# Patient Record
Sex: MALE | Race: BLACK OR AFRICAN AMERICAN | Employment: UNEMPLOYED | URBAN - METROPOLITAN AREA
[De-identification: names, ages, dates, MRNs, and addresses within clinical notes are randomized per-mention and may not be internally consistent; named-entity substitution may affect disease eponyms.]

---

## 2018-05-05 ENCOUNTER — HOSPITAL ENCOUNTER (INPATIENT)
Age: 33
LOS: 6 days | Discharge: HOME | DRG: 885 | End: 2018-05-11
Attending: PSYCHIATRY & NEUROLOGY | Admitting: PSYCHIATRY & NEUROLOGY
Payer: COMMERCIAL

## 2018-05-05 DIAGNOSIS — F28 OTHER PSYCHOTIC DISORDER NOT DUE TO SUBSTANCE OR KNOWN PHYSIOLOGICAL CONDITION (HCC): ICD-10-CM

## 2018-05-05 PROBLEM — F29 PSYCHOSIS (HCC): Status: ACTIVE | Noted: 2018-05-05

## 2018-05-05 LAB
ALBUMIN SERPL-MCNC: 4.1 G/DL (ref 3.5–5)
ALBUMIN/GLOB SERPL: 1.1 {RATIO} (ref 1.1–2.2)
ALP SERPL-CCNC: 69 U/L (ref 45–117)
ALT SERPL-CCNC: 43 U/L (ref 12–78)
ANION GAP SERPL CALC-SCNC: 7 MMOL/L (ref 5–15)
AST SERPL-CCNC: 133 U/L (ref 15–37)
BILIRUB SERPL-MCNC: 1.1 MG/DL (ref 0.2–1)
BUN SERPL-MCNC: 12 MG/DL (ref 6–20)
BUN/CREAT SERPL: 10 (ref 12–20)
CALCIUM SERPL-MCNC: 9.4 MG/DL (ref 8.5–10.1)
CHLORIDE SERPL-SCNC: 111 MMOL/L (ref 97–108)
CK SERPL-CCNC: 9441 U/L (ref 39–308)
CO2 SERPL-SCNC: 24 MMOL/L (ref 21–32)
CREAT SERPL-MCNC: 1.18 MG/DL (ref 0.7–1.3)
GLOBULIN SER CALC-MCNC: 3.6 G/DL (ref 2–4)
GLUCOSE SERPL-MCNC: 80 MG/DL (ref 65–100)
POTASSIUM SERPL-SCNC: 4.6 MMOL/L (ref 3.5–5.1)
PROT SERPL-MCNC: 7.7 G/DL (ref 6.4–8.2)
SODIUM SERPL-SCNC: 142 MMOL/L (ref 136–145)
TSH SERPL DL<=0.05 MIU/L-ACNC: 0.95 UIU/ML (ref 0.36–3.74)

## 2018-05-05 PROCEDURE — 84443 ASSAY THYROID STIM HORMONE: CPT | Performed by: PSYCHIATRY & NEUROLOGY

## 2018-05-05 PROCEDURE — 74011250637 HC RX REV CODE- 250/637: Performed by: PSYCHIATRY & NEUROLOGY

## 2018-05-05 PROCEDURE — 65220000003 HC RM SEMIPRIVATE PSYCH

## 2018-05-05 PROCEDURE — 82550 ASSAY OF CK (CPK): CPT | Performed by: PSYCHIATRY & NEUROLOGY

## 2018-05-05 PROCEDURE — 95816 EEG AWAKE AND DROWSY: CPT | Performed by: PSYCHIATRY & NEUROLOGY

## 2018-05-05 PROCEDURE — 74011250636 HC RX REV CODE- 250/636

## 2018-05-05 PROCEDURE — 36415 COLL VENOUS BLD VENIPUNCTURE: CPT | Performed by: PSYCHIATRY & NEUROLOGY

## 2018-05-05 PROCEDURE — 74011250636 HC RX REV CODE- 250/636: Performed by: NURSE PRACTITIONER

## 2018-05-05 PROCEDURE — 74011250636 HC RX REV CODE- 250/636: Performed by: PSYCHIATRY & NEUROLOGY

## 2018-05-05 PROCEDURE — 80053 COMPREHEN METABOLIC PANEL: CPT | Performed by: PSYCHIATRY & NEUROLOGY

## 2018-05-05 RX ORDER — BENZTROPINE MESYLATE 2 MG/1
2 TABLET ORAL
Status: DISCONTINUED | OUTPATIENT
Start: 2018-05-05 | End: 2018-05-11 | Stop reason: HOSPADM

## 2018-05-05 RX ORDER — ADHESIVE BANDAGE
30 BANDAGE TOPICAL DAILY PRN
Status: DISCONTINUED | OUTPATIENT
Start: 2018-05-05 | End: 2018-05-11 | Stop reason: HOSPADM

## 2018-05-05 RX ORDER — DIPHENHYDRAMINE HYDROCHLORIDE 50 MG/ML
INJECTION, SOLUTION INTRAMUSCULAR; INTRAVENOUS
Status: COMPLETED
Start: 2018-05-05 | End: 2018-05-05

## 2018-05-05 RX ORDER — IBUPROFEN 200 MG
1 TABLET ORAL
Status: DISCONTINUED | OUTPATIENT
Start: 2018-05-05 | End: 2018-05-11 | Stop reason: HOSPADM

## 2018-05-05 RX ORDER — FLUPHENAZINE HYDROCHLORIDE 1 MG/1
1 TABLET ORAL 2 TIMES DAILY
Status: DISCONTINUED | OUTPATIENT
Start: 2018-05-05 | End: 2018-05-06

## 2018-05-05 RX ORDER — ACETAMINOPHEN 325 MG/1
650 TABLET ORAL
Status: DISCONTINUED | OUTPATIENT
Start: 2018-05-05 | End: 2018-05-11 | Stop reason: HOSPADM

## 2018-05-05 RX ORDER — DIPHENHYDRAMINE HYDROCHLORIDE 50 MG/ML
50 INJECTION, SOLUTION INTRAMUSCULAR; INTRAVENOUS ONCE
Status: DISCONTINUED | OUTPATIENT
Start: 2018-05-05 | End: 2018-05-05

## 2018-05-05 RX ORDER — SODIUM CHLORIDE 0.9 % (FLUSH) 0.9 %
SYRINGE (ML) INJECTION
Status: COMPLETED
Start: 2018-05-05 | End: 2018-05-05

## 2018-05-05 RX ORDER — HALOPERIDOL 5 MG/ML
5 INJECTION INTRAMUSCULAR ONCE
Status: DISCONTINUED | OUTPATIENT
Start: 2018-05-05 | End: 2018-05-05

## 2018-05-05 RX ORDER — BENZTROPINE MESYLATE 1 MG/ML
2 INJECTION INTRAMUSCULAR; INTRAVENOUS
Status: DISCONTINUED | OUTPATIENT
Start: 2018-05-05 | End: 2018-05-11 | Stop reason: HOSPADM

## 2018-05-05 RX ORDER — IBUPROFEN 400 MG/1
400 TABLET ORAL
Status: DISCONTINUED | OUTPATIENT
Start: 2018-05-05 | End: 2018-05-11 | Stop reason: HOSPADM

## 2018-05-05 RX ORDER — LORAZEPAM 2 MG/ML
2 INJECTION INTRAMUSCULAR ONCE
Status: COMPLETED | OUTPATIENT
Start: 2018-05-05 | End: 2018-05-05

## 2018-05-05 RX ORDER — SODIUM CHLORIDE 9 MG/ML
125 INJECTION, SOLUTION INTRAVENOUS CONTINUOUS
Status: DISCONTINUED | OUTPATIENT
Start: 2018-05-05 | End: 2018-05-06

## 2018-05-05 RX ORDER — ZOLPIDEM TARTRATE 10 MG/1
10 TABLET ORAL
Status: DISCONTINUED | OUTPATIENT
Start: 2018-05-05 | End: 2018-05-11 | Stop reason: HOSPADM

## 2018-05-05 RX ORDER — OLANZAPINE 5 MG/1
5 TABLET ORAL
Status: DISCONTINUED | OUTPATIENT
Start: 2018-05-05 | End: 2018-05-11 | Stop reason: HOSPADM

## 2018-05-05 RX ORDER — LORAZEPAM 1 MG/1
1 TABLET ORAL
Status: DISCONTINUED | OUTPATIENT
Start: 2018-05-05 | End: 2018-05-11 | Stop reason: HOSPADM

## 2018-05-05 RX ORDER — LORAZEPAM 2 MG/ML
2 INJECTION INTRAMUSCULAR
Status: DISCONTINUED | OUTPATIENT
Start: 2018-05-05 | End: 2018-05-11 | Stop reason: HOSPADM

## 2018-05-05 RX ORDER — HALOPERIDOL 5 MG/ML
5 INJECTION INTRAMUSCULAR
Status: DISCONTINUED | OUTPATIENT
Start: 2018-05-05 | End: 2018-05-11 | Stop reason: HOSPADM

## 2018-05-05 RX ORDER — LORAZEPAM 2 MG/ML
INJECTION INTRAMUSCULAR
Status: DISPENSED
Start: 2018-05-05 | End: 2018-05-05

## 2018-05-05 RX ORDER — DIPHENHYDRAMINE HYDROCHLORIDE 50 MG/ML
50 INJECTION, SOLUTION INTRAMUSCULAR; INTRAVENOUS
Status: DISCONTINUED | OUTPATIENT
Start: 2018-05-05 | End: 2018-05-11 | Stop reason: HOSPADM

## 2018-05-05 RX ORDER — HALOPERIDOL 5 MG/ML
INJECTION INTRAMUSCULAR
Status: COMPLETED
Start: 2018-05-05 | End: 2018-05-05

## 2018-05-05 RX ADMIN — HALOPERIDOL LACTATE 5 MG: 5 INJECTION, SOLUTION INTRAMUSCULAR at 03:35

## 2018-05-05 RX ADMIN — LORAZEPAM 1 MG: 1 TABLET ORAL at 20:06

## 2018-05-05 RX ADMIN — SODIUM CHLORIDE 1000 ML: 900 INJECTION, SOLUTION INTRAVENOUS at 11:37

## 2018-05-05 RX ADMIN — FLUPHENAZINE HYDROCHLORIDE 1 MG: 1 TABLET, FILM COATED ORAL at 17:27

## 2018-05-05 RX ADMIN — FLUPHENAZINE HYDROCHLORIDE 1 MG: 1 TABLET, FILM COATED ORAL at 21:15

## 2018-05-05 RX ADMIN — Medication 10 ML: at 11:37

## 2018-05-05 RX ADMIN — DIPHENHYDRAMINE HYDROCHLORIDE 50 MG: 50 INJECTION, SOLUTION INTRAMUSCULAR; INTRAVENOUS at 03:34

## 2018-05-05 RX ADMIN — OLANZAPINE 5 MG: 5 TABLET, FILM COATED ORAL at 20:06

## 2018-05-05 RX ADMIN — LORAZEPAM 2 MG: 2 INJECTION INTRAMUSCULAR; INTRAVENOUS at 03:34
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
## 2018-05-05 NOTE — PROGRESS NOTES
Problem: Violent Restraints  Goal: *No harm/injury to patient while restraints in use  Outcome: Progressing Towards Goal  No S/S of injury, pulses checked, circulation intact. Will continue to monitor.

## 2018-05-05 NOTE — BH NOTES
GRATITUDE GROUP THERAPY PROGRESS NOTE    The patient Giovani miller 33 y.o. male is participating in Gratitude Group.     Group time: 15 minutes    Personal goal for participation: Appreciation of all things big and small    Goal orientation: Gratitude exercises    Group therapy participation: active    Therapeutic interventions reviewed and discussed:  Yes    Impression of participation: active    Sarah Mcgrath  5/5/2018  11:19 AM

## 2018-05-05 NOTE — BSMART NOTE
Behavioral Restraint Face-to-Face Evaluation  (must be completed within one hour of initiation of restraints)      Evaluate immediate situation:  Agitated, restless, paranoid,  unpredictable, and unable to be safe. Reaction to intervention: restraints continued    Medical Condition/Assessment: unknown    Behavioral Condition/Assessment: Agitated, restless, paranoid,  unpredictable, and unable to be safe. The patients review of systems, history, medications, and recent labs were reviewed at this time.      Continue/Discontinue restraints at this time: Continue

## 2018-05-05 NOTE — BH NOTES
BEHAVIORAL HEALTH RESTRAINT/SECLUSION INITIAL ASSESSMENT      1. Assessment of High Risk factors: Preexisting medical conditions that places patient at greater risk when placed in restraints are as follows: None    2. Preexisting history of psychological conditions that place patient at greater risk when placed in restraints: None    3. Current behavior/mental status: Confused, Delusional, Disoriented and Hallucinating    4. Initial use of restraint for this patient is justified by: Imminent risk of injury to others and Imminent risk of injury to self    5. Least restrictive tools/methods (Check all that apply): Attended comfort needs, Attentive listening, Problem solving, PRN medications, Relaxation and Verbal de-escalation    6. Criteria for release: No longer verbalizing threats of harm to self or others, Acute signs and symptoms necessitating restraint/seclusion have decreased substantially to the level where patient safety function in less restrictive environment and Substantial reduction in level of agitation/anxiety as indicated in reduction in motor over activity, ability to focus, maintain attention and decrease in hostility    7. Treatment plan revisions to assist the patient in regaining control: Anger assessment, Continue problem solving discussions with staff and Medication evaluation    8. Patient consented to family involvement in restraint/seclusion process: No    9. Personal safety search completed: Yes - multiple abrasions on skin present prior to arrival    10.  Vital Signs:         B/P: 133/82         Pulse:71         Respirations:18         Temperature: 98.4        MD/RN Signature:_________________________________  Date:_____________
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
## 2018-05-05 NOTE — PROGRESS NOTES
Problem: Altered Thought Process (Adult/Pediatric)  Goal: *STG: Remains safe in hospital  Outcome: Progressing Towards Goal  Visible on the unit. Mood is paranoid and anxious. Pacing noted. Pt refused IV fluids. Encouraged pt to increase oral fluids. Pt refused CT. Education provided. Continue to encourage and educate.

## 2018-05-05 NOTE — BH NOTES
GROUP THERAPY PROGRESS NOTE    Rina Christianson is participating in D/C Planning Group    Group time: 1 hour    Personal goal for participation: Readiness for discharge    Goal orientation: Tapping Into Your [address] [place] [address] for [facility]    Group therapy participation: minimal    Therapeutic interventions reviewed and discussed:     Impression of participation: Engaged, provided some input  Into group and shared minimal information about himself

## 2018-05-05 NOTE — BH NOTES
TRANSFER - IN REPORT:    Verbal report received from Arnulfo Villar RN(name) on Yazan Espinosa  being received from [facility], ER(unit) for routine progression of care      Report consisted of patients Situation, Background, Assessment and   Recommendations(SBAR). Information from the following report(s) SBAR, Kardex, ED Summary and Recent Results was reviewed with the receiving nurse. Opportunity for questions and clarification was provided. Assessment completed upon patients arrival to unit and care assumed.
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
## 2018-05-05 NOTE — INTERDISCIPLINARY ROUNDS
Behavioral Health Interdisciplinary Rounds     Patient Name: Lucio Garcia  Age: 33 y.o. Room/Bed:  730/01  Primary Diagnosis: <principal problem not specified>   Admission Status: TDO     Readmission within 30 days: no  Power of  in place: unknown  Patient requires a blocked bed: yes          Reason for blocked bed: Behavior    VTE Prophylaxis: No    Mobility needs/Fall risk: no    Nutritional Plan: no  Consults: Triage hospitalist         Labs/Testing due today?: yes    Sleep hours: 0       Participation in Care/Groups: New admit  Medication Compliant? No scheduled meds  PRNS (last 24 hours): Antipsychotic (IM), Antianxiety and Sleep Aid    Restraints (last 24 hours):  yes     CIWA (range last 24 hours):     COWS (range last 24 hours):      Alcohol screening (AUDIT) completed -   AUDIT Score: 0     If applicable, date SBIRT discussed in treatment team AND documented:     Tobacco - patient is a smoker: Have You Used Tobacco in the Past 30 Days: No  Illegal Drugs use:      24 hour chart check complete: yes     Patient goal(s) for today: Meet with Tx team and attend groups   Treatment team focus/goals: Complete Psych Social Assessment   Progress note: Alert, engaging,, pleasant, respectful,  denied having any psychosis and unsure of the events that lead to hospitalization    LOS:  0  Expected LOS:    Financial concerns/prescription coverage:  TDO  Date of last family contact:      Family requesting physician contact today:    Discharge plan: return to live with hay and their kids. (2)   Guns in the home: denied      Outpatient provider(s): TBD    Participating treatment team members: Lucio Garcia, Dr. Ronnie Mora RN and Yina Strickland

## 2018-05-05 NOTE — IP AVS SNAPSHOT
[address] 
[phone] Patient: Radha Higginbotham MRN: [id number] :1985 A check joel indicates which time of day the medication should be taken. My Medications START taking these medications Instructions Each Dose to Equal  
 Morning Noon Evening Bedtime  
 amLODIPine 10 mg tablet Commonly known as:  Kannan Rings Start taking on:  2018 Your last dose was: Your next dose is: Take 1 Tab by mouth daily. Indications: hypertension 10 mg  
    
   
   
   
  
 fluPHENAZine 5 mg tablet Commonly known as:  PROLIXIN Your last dose was: Your next dose is: Take 1 Tab by mouth two (2) times a day. Indications: Psychotic Disorder 5 mg Where to Get Your Medications These medications were sent to [facility] Bianca Tang, [address] [place] [address], [address] [place] [address] Phone:  [phone]  
  amLODIPine 10 mg tablet  
 fluPHENAZine 5 mg tablet

## 2018-05-05 NOTE — BH NOTES
Behavioral Restraint Face-to-Face Evaluation  (must be completed within one hour of initiation of restraints)    Evaluate immediate situation:  Pt agitated, combative and actively hallucinating. States he sees demons and is afraid he will hurt someone. Reaction to intervention: Pt remains restless and agitated. Medical Condition/Assessment: None listed    Behavioral Condition/Assessment: Pt agitated, combative and actively hallucinating. States he sees demons and is afraid he will hurt someone. VS: 98.4 -71-18 - 133/82. O2 Sat  98%. The patients review of systems, history, medications, and recent labs were reviewed at this time. Continue/Discontinue restraints at this time: Continue       0425- Pt remains restless and hallucinating. Respirations even and unlabored. 4 point restraints and 1:1 monitoring   continued. 7398 - Pt continues to be restless, mumble and hallucinate. 4 point restraints continued for safety. 1:1 staff present. NAD. Respirations even and unlabored at 12.    0615 - Pt remains restless; continues to hallucinate; unable to meet release criteria. NAD. Respirations remain even & unlabored. Remains in 4 pt restraints. 1:1 monitoring continued.

## 2018-05-05 NOTE — IP AVS SNAPSHOT
Summary of Care Report The Summary of Care report has been created to help improve care coordination. Users with access to [employer] or [address] (Web-based application) may access additional patient information including the Discharge Summary. If you are not currently a [address] user and need more information, please call the number listed below in the [place] [address] section and ask to be connected with Medical Records. Facility Information Name Address Phone [address] [facility] 7 [phone] [phone] Patient Information Patient Name Sex SNEHAL Ch ([id number]) Male 1985 Discharge Information Admitting Provider Service Area Unit Yung Yoon MD / Surjit Smith [phone] / [phone] Discharge Provider Discharge Date/Time Discharge Disposition Destination (none) 2018 Afternoon (Pending) AHR (none) Patient Language Language ENGLISH [13] Hospital Problems as of 2018  Never Reviewed Class Noted - Resolved Last Modified POA Active Problems * (Principal)Psychosis  2018 - Present 2018 by Angelica Salguero MD Yes Entered by Yung Yoon MD  
  
You are allergic to the following No active allergies Current Discharge Medication List  
  
START taking these medications Dose & Instructions Dispensing Information Comments  
 amLODIPine 10 mg tablet Commonly known as:  Remonia Grates Start taking on:  2018 Dose:  10 mg Take 1 Tab by mouth daily. Indications: hypertension Quantity:  7 Tab Refills:  0  
   
 fluPHENAZine 5 mg tablet Commonly known as:  PROLIXIN Dose:  5 mg Take 1 Tab by mouth two (2) times a day. Indications: Psychotic Disorder Quantity:  14 Tab Refills:  0 Follow-up Information Follow up With Details Comments Contact Info [place] [address] [facility] On 2018 You have an 11:30am hospital discharge appointment with Sharri Maryjohnathan. Please remember to bring your photo ID. [address] [facility] [place] [facility] 
[phone] None   None (395) Patient stated that they have no PCP Discharge Instructions DISCHARGE SUMMARY 
 
NAME:[place] : 1985 MRN: [id number] The patient Glorine Drivers exhibits the ability to control behavior in a less restrictive environment. Patient's level of functioning is improving. No assaultive/destructive behavior has been observed for the past 24 hours. No suicidal/homicidal threat or behavior has been observed for the past 24 hours. There is no evidence of serious medication side effects. Patient has not been in physical or protective restraints for at least the past 24 hours. If weapons involved, how are they secured? No weapons involved Is patient aware of and in agreement with discharge plan? Yes Arrangements for medication:  Prescriptions filled per [place] Referral for substance abuse treatment? Yes, [place] [address] [facility] Referral for smoking cessation needed? Patient is not a smoker/Not applicable Copy of discharge instructions to  provider?:  Yes, [place] [address] [facility] (Patricia Terrell. [phone]; Sindy Kerr [phone]) Arrangements for transportation home:  Family to  Keep all follow up appointments as scheduled, continue to take prescribed medications per physician instructions. Mental health crisis number:  178 or your local mental health crisis line number at [phone]. DISCHARGE SUMMARY from Nurse PATIENT INSTRUCTIONS: 
  
What to do at Home: 
Recommended activity: Activity as tolerated, If you experience any of the following symptoms thoughts of harming self, feeling paranoid and unsafe, please follow up with your assigned providers and local crisis number. *  Please give a list of your current medications to your Primary Care Provider. *  Please update this list whenever your medications are discontinued, doses are 
    changed, or new medications (including over-the-counter products) are added. *  Please carry medication information at all times in case of emergency situations. These are general instructions for a healthy lifestyle: No smoking/ No tobacco products/ Avoid exposure to second hand smoke Surgeon General's Warning:  Quitting smoking now greatly reduces serious risk to your health. Obesity, smoking, and sedentary lifestyle greatly increases your risk for illness A healthy diet, regular physical exercise & weight monitoring are important for maintaining a healthy lifestyle You may be retaining fluid if you have a history of heart failure or if you experience any of the following symptoms:  Weight gain of 3 pounds or more overnight or 5 pounds in a week, increased swelling in our hands or feet or shortness of breath while lying flat in bed. Please call your doctor as soon as you notice any of these symptoms; do not wait until your next office visit. Recognize signs and symptoms of STROKE: 
 
F-face looks uneven A-arms unable to move or move unevenly S-speech slurred or non-existent T-time-call 911 as soon as signs and symptoms begin-DO NOT go Back to bed or wait to see if you get better-TIME IS BRAIN. Warning Signs of HEART ATTACK Call 911 if you have these symptoms: 
? Chest discomfort. Most heart attacks involve discomfort in the center of the chest that lasts more than a few minutes, or that goes away and comes back. It can feel like uncomfortable pressure, squeezing, fullness, or pain. ? Discomfort in other areas of the upper body. Symptoms can include pain or discomfort in one or both arms, the back, neck, jaw, or stomach. ? Shortness of breath with or without chest discomfort. ? Other signs may include breaking out in a cold sweat, nausea, or lightheadedness. Don't wait more than five minutes to call [address]! Fast action can save your life. Calling 911 is almost always the fastest way to get lifesaving treatment. Emergency Medical Services staff can begin treatment when they arrive  up to an hour sooner than if someone gets to the hospital by car. The discharge information has been reviewed with the patient. The patient verbalized understanding. Discharge medications reviewed with the patient and appropriate educational materials and side effects teaching were provided. ___________________________________________________________________________________________________________________________________ Chart Review Routing History No Routing History on File

## 2018-05-05 NOTE — PROGRESS NOTES
Pt alert oriented calm cooperative. CK 9441. Hospitalist aware. Order given to place IV, given bolus fluid then continuous fluid, reassess lab work in morning. Education provided to pt. Pt verbally agrees to IV  And IV fluids. 1145- 20 g IV placed in left forearm by Keiry Medeiros RN from ICU Unit. 2 attempts to place IV. Bolus normal saline 1000 mL/hr running. Pt tolerating poorly. 250 mL input. 1310- pt met with NP. VS stable. 1330- Pt refuses IV fluids. Education provided. Pt continues to refuses IV fluids stating \"I can only do a little at a time I guess. \" pt agrees to increase oral fluid intake. Water provided. 46- mother visiting pt. Mother focused on bible with loud speech. Pt begins pacing unit, pt becoming  Religiously focused. Increased pacing attempting to hug peers and staff. Pt redirected. Visit ends early. Pt refuses prn medications. Education provided. Staff providing therapeutic communication   [phone]- NP aware pt is refusing IV fluids. Oral fluids encouraged. 1425- pt refuses to sign CANELO for mother.
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
## 2018-05-05 NOTE — BH NOTES
[address] [facility] 60  Master Treatment Plan for Arnulfo Chew    Date Treatment Plan Initiated: 5/5/2018    Treatment Plan Modalities:  Type of Modality Amount  (x minutes) Frequency (x/week) Duration (x days) Name of Responsible Staff   [address] [facility] meetings to encourage peer interactions 15 7 1 ANDRE Eagle     Group psychotherapy to assist in building coping skills and internal controls 60 7 1 Ned Dunham   Therapeutic activity groups to build coping skills 60 7 1 Ned Dunham   Psychoeducation in group setting to address:   Medication education   15 7 1 RENEA Costa   Coping skills         Relaxation techniques         Symptom management         Discharge planning   60 2 Carolyn Lee 115   60 1 1 volunteer   Recovery/AA/NA      volunteer   Physician medication management   15 7 1 Dr Juana Plolard   15 2 [facility]

## 2018-05-05 NOTE — BH NOTES
0720: Received pt in restraints. Pt is restless. Pt stated to writer, \"I just smoked weed. \" Will continue to reassess. 0756: Pt is currently sleeping AEB snoring. Restraints DC.    7717: Pt is currently sleeping in psych 1.                    0756: BEHAVIORAL HEALTH RESTRAINT/SECLUSION PROGRESS NOTE TERMINATION OF RESTRAINT/SECLUSION    1. Current mental status/behavior: Calm    2. Criteria for release met: No longer verbalizing threats of harm to self and others, Acute signs and symptoms necessitating restraint/seclusion have decreased substantially to the level where patient can safely function in least restrictive environment. and Substantial reduction in level of agitation/anxiety as indicated by reduction in motor over activity, ability to focus, maintian attention and decrease in hostility    3. Additional interventions to prevent recurrence of dangerous behaviors include the following in addition to the debriefing process by the team.   n/a      RN Signature__________________________________ Date_______________      MD Signature__________________________________ Date_______________          7418: RESTRAINT DEBRIEFING FORM FOR BEHAVIOR MANAGEMENT MANJU Villalobos                                                                                               1. Did the patient request family involvement in the debriefing meeting: NO    2. Is patient  involved in the debriefing: YES    3. Did patient request family be notified of application of restraint: NO  If YES, who was notified? n/a Their perception of the event: n/a    4. Date restraint applied: 5/5/2018 Time restraint applied: 8046    6. Type of restraint applied: 4 Point    6. Who applied restraints (names): night shift staff    7. Why was restraint applied: combative behavior     8. What led to the application of restraint: combative behavior     9. What measures were tried prior to application of restraint: PRN meds     10.  During the time the patient was restrained, were the following addressed: Physical Well Being, YES, Psychological Comfort, YES, Right to Privacy, YES    11. Did the patient sustain any trauma from this incident: NO  If YES, explain: n/a  Did staff sustain any trauma from this incident: NO  If YES, explain: n/a    12. Was patient restrained/secluded for more than 12 hours? NO  If YES, was Clinical Medical Director notified? N/A  If YES, name of  on call notified: n/a    13. Did patient have (2) or more separate behavior episodes within a 12 hour period? YES  If YES, was Clinical Medical Director notified? N/A  If YES, name of  on call notified: n/a    14. What staff members participated in the debriefing? Omar Alexander RN    15. What issues were identified as a result of the debriefing? none    16.  Based on the incident, was the patient's Care Plan modified: NO  If YES, explain: n/a    RN Signature_______________________________________Date_______Time______  Patient's Signature___________________________________Date_______Time______

## 2018-05-05 NOTE — BH NOTES
PRN Medication Documentation    Specific patient behavior that led to need for PRN medication: Pt agitated, combative and actively hallucinating. Staff interventions attempted prior to PRN being given: redirection, therapeutic communication  PRN medication given: Benadryl 50 mg IM, Haldol 5 mg IM and Ativan 2 mg IM. Patient response/effectiveness of PRN medication: Pending      0425 - Pt remains restless and continues to hallucinate.

## 2018-05-05 NOTE — IP AVS SNAPSHOT
[address] 
[phone] Patient: Kalyan Tovar MRN: [id number] :1985 About your hospitalization You were admitted on: May 5, 2018 You last received care in the:  [address] You were discharged on:  May 11, 2018 Why you were hospitalized Your primary diagnosis was:  Psychosis Follow-up Information Follow up With Details Comments Contact [address] On 2018 You have an 11:30am hospital discharge appointment with Darleen Mahmood. Please remember to bring your photo ID. [address] [facility]., [place] [address] [facility] 7 
[phone] None   None ([phone]) Patient stated that they have no PCP Discharge Orders None A check joel indicates which time of day the medication should be taken. My Medications START taking these medications Instructions Each Dose to Equal  
 Morning Noon Evening Bedtime  
 amLODIPine 10 mg tablet Commonly known as:  Sundra Alburnett Start taking on:  2018 Your last dose was: Your next dose is: Take 1 Tab by mouth daily. Indications: hypertension 10 mg  
    
   
   
   
  
 fluPHENAZine 5 mg tablet Commonly known as:  PROLIXIN Your last dose was: Your next dose is: Take 1 Tab by mouth two (2) times a day. Indications: Psychotic Disorder 5 mg Where to Get Your Medications These medications were sent to [facility] Bianca Tang, [address] [place] [address], [address] [place] [address] Phone:  [phone]  
  amLODIPine 10 mg tablet  
 fluPHENAZine 5 mg tablet Discharge Instructions DISCHARGE SUMMARY 
 
NAME:[place] : 1985 MRN: [id number] The patient Kalyan Tovar exhibits the ability to control behavior in a less restrictive environment. Patient's level of functioning is improving. No assaultive/destructive behavior has been observed for the past 24 hours. No suicidal/homicidal threat or behavior has been observed for the past 24 hours. There is no evidence of serious medication side effects. Patient has not been in physical or protective restraints for at least the past 24 hours. If weapons involved, how are they secured? No weapons involved Is patient aware of and in agreement with discharge plan? Yes Arrangements for medication:  Prescriptions filled per [facility] Referral for substance abuse treatment? Yes, [place] [address] [facility] Referral for smoking cessation needed? Patient is not a smoker/Not applicable Copy of discharge instructions to  provider?:  Yes, [place] [address] [facility] ([facility], [facility] [phone]; [facility] [place] [phone]) Arrangements for transportation home:  Family to  Keep all follow up appointments as scheduled, continue to take prescribed medications per physician instructions. Mental health crisis number:  860 or your local mental health crisis line number at [phone]. DISCHARGE SUMMARY from Nurse PATIENT INSTRUCTIONS: 
  
What to do at Home: 
Recommended activity: Activity as tolerated, If you experience any of the following symptoms thoughts of harming self, feeling paranoid and unsafe, please follow up with your assigned providers and local crisis number. *  Please give a list of your current medications to your Primary Care Provider. *  Please update this list whenever your medications are discontinued, doses are 
    changed, or new medications (including over-the-counter products) are added. *  Please carry medication information at all times in case of emergency situations. These are general instructions for a healthy lifestyle: No smoking/ No tobacco products/ Avoid exposure to second hand smoke Surgeon General's Warning:  Quitting smoking now greatly reduces serious risk to your health. Obesity, smoking, and sedentary lifestyle greatly increases your risk for illness A healthy diet, regular physical exercise & weight monitoring are important for maintaining a healthy lifestyle You may be retaining fluid if you have a history of heart failure or if you experience any of the following symptoms:  Weight gain of 3 pounds or more overnight or 5 pounds in a week, increased swelling in our hands or feet or shortness of breath while lying flat in bed. Please call your doctor as soon as you notice any of these symptoms; do not wait until your next office visit. Recognize signs and symptoms of STROKE: 
 
F-face looks uneven A-arms unable to move or move unevenly S-speech slurred or non-existent T-time-call 911 as soon as signs and symptoms begin-DO NOT go Back to bed or wait to see if you get better-TIME IS BRAIN. Warning Signs of HEART ATTACK Call 911 if you have these symptoms: 
? Chest discomfort. Most heart attacks involve discomfort in the center of the chest that lasts more than a few minutes, or that goes away and comes back. It can feel like uncomfortable pressure, squeezing, fullness, or pain. ? Discomfort in other areas of the upper body. Symptoms can include pain or discomfort in one or both arms, the back, neck, jaw, or stomach. ? Shortness of breath with or without chest discomfort. ? Other signs may include breaking out in a cold sweat, nausea, or lightheadedness. Don't wait more than five minutes to call [address]! Fast action can save your life. Calling 911 is almost always the fastest way to get lifesaving treatment. Emergency Medical Services staff can begin treatment when they arrive  up to an hour sooner than if someone gets to the hospital by car. The discharge information has been reviewed with the patient.   The patient verbalized understanding. Discharge medications reviewed with the patient and appropriate educational materials and side effects teaching were provided. ___________________________________________________________________________________________________________________________________ Introducing [facility]! Qi Guardado introduces [employer] patient portal. Now you can access parts of your medical record, email your doctor's office, and request medication refills online. 1. In your internet browser, go to [email or web address] 2. Click on the First Time User? Click Here link in the Sign In box. You will see the New Member Sign Up page. 3. Enter your [employer] Access Code exactly as it appears below. You will not need to use this code after youve completed the sign-up process. If you do not sign up before the expiration date, you must request a new code. · [employer] Access Code: [email or web address] Expires: 8/9/2018  9:04 AM 
 
4. Enter the last four digits of your Social Security Number (xxxx) and Date of Birth (mm/dd/yyyy) as indicated and click Submit. You will be taken to the next sign-up page. 5. Create a [employer] ID. This will be your [employer] login ID and cannot be changed, so think of one that is secure and easy to remember. 6. Create a [employer] password. You can change your password at any time. 7. Enter your Password Reset Question and Answer. This can be used at a later time if you forget your password. 8. Enter your e-mail address. You will receive e-mail notification when new information is available in [facility] 9. Click Sign Up. You can now view and download portions of your medical record. 10. Click the Download Summary menu link to download a portable copy of your medical information. If you have questions, please visit the Frequently Asked Questions section of the [employer] website.  Remember, [employer] is NOT to be used for urgent needs. For medical emergencies, dial 911. Now available from your iPhone and Android! Introducing [employer]w As a [employer] patient, I wanted to make you aware of our electronic visit tool called [employer]w. [employer] 24/7 allows you to connect within minutes with a medical provider 24 hours a day, seven days a week via a mobile device or tablet or logging into a secure website from your computer. You can access Phong Askew from anywhere in the [place]. A virtual visit might be right for you when you have a simple condition and feel like you just dont want to get out of bed, or cant get away from work for an appointment, when your regular [employer] provider is not available (evenings, weekends or holidays), or when youre out of town and need minor care. Electronic visits cost only $49 and if the [employer] 24/7 provider determines a prescription is needed to treat your condition, one can be electronically transmitted to a nearby pharmacy*. Please take a moment to enroll today if you have not already done so. The enrollment process is free and takes just a few minutes. To enroll, please download the [employer] 24/7 alin to your tablet or phone, or visit [email or web address] to enroll on your computer. And, as an [address] [place] [address] patient with a [employer] account, the results of your visits will be scanned into your electronic medical record and your primary care provider will be able to view the scanned results. We urge you to continue to see your regular [employer] provider for your ongoing medical care. And while your primary care provider may not be the one available when you seek a Ph[employer]w virtual visit, the peace of mind you get from getting a real diagnosis real time can be priceless. For more information on [employer]kew, view our Frequently Asked Questions (FAQs) at [email or web address].  
 
Sincerely, 
 
 Cyndi Ontiveros MD 
Chief Medical Officer Radha Veloz *:  certain medications cannot be prescribed via [employer] Providers Seen During Your Hospitalization Provider Specialty Primary office phone Vickie Hdz MD Psychiatry [phone] Your Primary Care Physician (PCP) Primary Care Physician Office Phone Office Fax NONE ** None ** ** None ** You are allergic to the following No active allergies Recent Documentation Height Weight BMI  
  
  
 1.854 m 86.2 kg 25.07 kg/m2 Emergency Contacts Name Discharge Info Relation Home Work Mobile None,None N/A  AT THIS TIME [6] Patient Belongings The following personal items are in your possession at time of discharge: 
  Dental Appliances: None  Visual Aid: None      Home Medications: None   Jewelry: None  Clothing: At bedside, Shirt, Shorts, Undergarments, With patient    Other Valuables: None  Personal Items Sent to Safe: none Please provide this summary of care documentation to your next provider. Signatures-by signing, you are acknowledging that this After Visit Summary has been reviewed with you and you have received a copy. Patient Signature:  ____________________________________________________________ Date:  ____________________________________________________________  
  
Seb De Luna Provider Signature:  ____________________________________________________________ Date:  ____________________________________________________________

## 2018-05-06 LAB
AMMONIA PLAS-SCNC: 56 UMOL/L
ANION GAP SERPL CALC-SCNC: 7 MMOL/L (ref 5–15)
BUN SERPL-MCNC: 14 MG/DL (ref 6–20)
BUN/CREAT SERPL: 11 (ref 12–20)
CALCIUM SERPL-MCNC: 9.1 MG/DL (ref 8.5–10.1)
CHLORIDE SERPL-SCNC: 108 MMOL/L (ref 97–108)
CHOLEST SERPL-MCNC: 147 MG/DL
CK SERPL-CCNC: 8839 U/L (ref 39–308)
CO2 SERPL-SCNC: 27 MMOL/L (ref 21–32)
CREAT SERPL-MCNC: 1.25 MG/DL (ref 0.7–1.3)
EST. AVERAGE GLUCOSE BLD GHB EST-MCNC: 100 MG/DL
GLUCOSE P FAST SERPL-MCNC: 89 MG/DL (ref 65–100)
GLUCOSE SERPL-MCNC: 92 MG/DL (ref 65–100)
HBA1C MFR BLD: 5.1 % (ref 4.2–6.3)
HDLC SERPL-MCNC: 69 MG/DL
HDLC SERPL: 2.1 {RATIO} (ref 0–5)
LDLC SERPL CALC-MCNC: 64.8 MG/DL (ref 0–100)
LIPID PROFILE,FLP: NORMAL
POTASSIUM SERPL-SCNC: 3.7 MMOL/L (ref 3.5–5.1)
SODIUM SERPL-SCNC: 142 MMOL/L (ref 136–145)
TRIGL SERPL-MCNC: 66 MG/DL (ref ?–150)
VLDLC SERPL CALC-MCNC: 13.2 MG/DL

## 2018-05-06 PROCEDURE — 36415 COLL VENOUS BLD VENIPUNCTURE: CPT | Performed by: NURSE PRACTITIONER

## 2018-05-06 PROCEDURE — 74011250637 HC RX REV CODE- 250/637: Performed by: PSYCHIATRY & NEUROLOGY

## 2018-05-06 PROCEDURE — 82550 ASSAY OF CK (CPK): CPT | Performed by: NURSE PRACTITIONER

## 2018-05-06 PROCEDURE — 74011250636 HC RX REV CODE- 250/636: Performed by: NURSE PRACTITIONER

## 2018-05-06 PROCEDURE — 82140 ASSAY OF AMMONIA: CPT | Performed by: PSYCHIATRY & NEUROLOGY

## 2018-05-06 PROCEDURE — 65220000003 HC RM SEMIPRIVATE PSYCH

## 2018-05-06 PROCEDURE — 83036 HEMOGLOBIN GLYCOSYLATED A1C: CPT | Performed by: PSYCHIATRY & NEUROLOGY

## 2018-05-06 PROCEDURE — 80048 BASIC METABOLIC PNL TOTAL CA: CPT | Performed by: NURSE PRACTITIONER

## 2018-05-06 PROCEDURE — 82947 ASSAY GLUCOSE BLOOD QUANT: CPT | Performed by: PSYCHIATRY & NEUROLOGY

## 2018-05-06 PROCEDURE — 80061 LIPID PANEL: CPT | Performed by: PSYCHIATRY & NEUROLOGY

## 2018-05-06 RX ORDER — SODIUM CHLORIDE 0.9 % (FLUSH) 0.9 %
5-10 SYRINGE (ML) INJECTION EVERY 8 HOURS
Status: DISCONTINUED | OUTPATIENT
Start: 2018-05-06 | End: 2018-05-11 | Stop reason: HOSPADM

## 2018-05-06 RX ORDER — HALOPERIDOL 5 MG/ML
INJECTION INTRAMUSCULAR
Status: DISPENSED
Start: 2018-05-06 | End: 2018-05-07

## 2018-05-06 RX ORDER — FLUPHENAZINE HYDROCHLORIDE 5 MG/1
5 TABLET ORAL 2 TIMES DAILY
Status: DISCONTINUED | OUTPATIENT
Start: 2018-05-06 | End: 2018-05-11 | Stop reason: HOSPADM

## 2018-05-06 RX ADMIN — FLUPHENAZINE HYDROCHLORIDE 1 MG: 1 TABLET, FILM COATED ORAL at 09:08

## 2018-05-06 RX ADMIN — OLANZAPINE 5 MG: 5 TABLET, FILM COATED ORAL at 13:21

## 2018-05-06 RX ADMIN — LORAZEPAM 1 MG: 1 TABLET ORAL at 13:21

## 2018-05-06 RX ADMIN — SODIUM CHLORIDE 500 ML: 900 INJECTION, SOLUTION INTRAVENOUS at 14:31

## 2018-05-06 RX ADMIN — Medication 10 ML: at 21:11

## 2018-05-06 RX ADMIN — Medication 10 ML: at 06:04

## 2018-05-06 RX ADMIN — SODIUM CHLORIDE 500 ML: 900 INJECTION, SOLUTION INTRAVENOUS at 19:30

## 2018-05-06 RX ADMIN — FLUPHENAZINE HYDROCHLORIDE 5 MG: 5 TABLET, FILM COATED ORAL at 21:11
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
## 2018-05-06 NOTE — PROGRESS NOTES
Problem: Altered Thought Process (Adult/Pediatric)  Goal: *STG: Remains safe in hospital  Outcome: Progressing Towards Goal  Visible on the unit. Mood is anxious. Pt denies SI. Pt denies AVH. Compliant with meals and medications. Continue to encourage and educate.

## 2018-05-06 NOTE — PROGRESS NOTES
Problem: Falls - Risk of  Goal: *Absence of Falls  Document Kenisha Fall Risk and appropriate interventions in the flowsheet. Outcome: Progressing Towards Goal  Fall Risk Interventions:   Pt in bed asleep. NAD. No falls noted/reported. IV Lock noted in L antecubital space. No redness/swelling noted. Per report, pt refused continuous IV  Fluids. Will continue to encouraged  increase po fluids when awake. .Q 15 minute checks for safety maintained.     Mentation Interventions: Adequate sleep, hydration, pain control, Reorient patient, More frequent rounding

## 2018-05-06 NOTE — INTERDISCIPLINARY ROUNDS
Behavioral Health Interdisciplinary Rounds     Patient Name: Mary Day  Age: 33 y.o. Room/Bed:  730/  Primary Diagnosis: Psychosis   Admission Status: TDO     Readmission within 30 days: no  Power of  in place:unknown  Patient requires a blocked bed: no          Reason for blocked bed: n/a    VTE Prophylaxis: No    Mobility needs/Fall risk: no    Nutritional Plan: no  Consults: No         Labs/Testing due today?: yes    Sleep hours: 7.5       Participation in Care/Groups:  yes  Medication Compliant?: Yes  PRNS (last 24 hours):  Antipsychotic (PO) and Antianxiety    Restraints (last 24 hours):  yes   CIWA (range last 24 hours):     COWS (range last 24 hours):      Alcohol screening (AUDIT) completed -         If applicable, date SBIRT discussed in treatment team AND documented:     Tobacco - patient is a smoker:    Illegal Drugs use:      24 hour chart check complete: no - Admitted less than 24 hours ago    Patient goal(s) for today:   Treatment team focus/goals:   Progress note     LOS:  1  Expected LOS:     Financial concerns/prescription coverage:    Date of last family contact:       Family requesting physician contact today:    Discharge plan:   Guns in the home:         Outpatient provider(s):     Participating treatment team members: Mary Day, * (assigned SW),

## 2018-05-06 NOTE — PROGRESS NOTES
Eduction provided. Pt refuses IV fluids states he will increase oral fluid intake. 1210- 1000 mL water po intake     Writer spoke to DIYA VERDUGO. Discussed lab results. CK 8839. Order for IV bolus to run over 30 q 6 hrs. Reassess labs in AM. Education provided to pt. IV infiltrated/ removed. Pt verbalizes understanding. Pt paranoid. Attempting place new peripheral IV. One attempt to place IV not successful. 1320- pt refuses IV placement. PRN Medication Documentation    Specific patient behavior that led to need for PRN medication: paranoid delusions, pt request medication   Staff interventions attempted prior to PRN being given: education, therapeutic communication, decreases stimuli    PRN medication given: po 1 mg ativan, po 5 mg Zyprexa   Patient response/effectiveness of PRN medication: pt practicing coping skills. 1430- IV placed (4 attempts). Pt receiving IV NS bolus over 30 min. Visiting with family.
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
## 2018-05-06 NOTE — BH NOTES
GROUP THERAPY PROGRESS NOTE    Dio Chavez is participating in [employer]. Group time: 30 minutes    Personal goal for participation: relaxation    Goal orientation: personal    Group therapy participation: active    Therapeutic interventions reviewed and discussed: Pictures to color and word search. Impression of participation: On unit much of shift and socializing with peers. Periods of increased anxiety and tearfulness including when father visited. \"Father, why hast thou forsaken me! \" Observed kneeling in hallway with forehead touching floor during father's visit. Visibly upset when dad left and calling out for him. Eventually accepted staff's reassurance that he could talk/see father the next day.

## 2018-05-06 NOTE — BH NOTES
PRN Medication Documentation    Specific patient behavior that led to need for PRN medication: Patient was visiting with father; kneeling on the floor. Followed his father to the exit door yelling, \"dad dad. \"  Staff interventions attempted prior to PRN being given: Used redirections and offered PRN meds  PRN medication given: Ativan 1 mg PO and Zyprexa 5 mg PO  Patient response/effectiveness of PRN medication: Will continue to monitor.

## 2018-05-06 NOTE — PROGRESS NOTES
Problem: Altered Thought Process (Adult/Pediatric)  Goal: *STG: Remains safe in hospital  Outcome: Progressing Towards Goal  Visible on the unit. Mood is anxious. Compliant with meals and medications. Pt refused CT. Continue to encourage and educate.

## 2018-05-07 LAB
ANION GAP SERPL CALC-SCNC: 7 MMOL/L (ref 5–15)
BUN SERPL-MCNC: 12 MG/DL (ref 6–20)
BUN/CREAT SERPL: 10 (ref 12–20)
CALCIUM SERPL-MCNC: 8.9 MG/DL (ref 8.5–10.1)
CHLORIDE SERPL-SCNC: 108 MMOL/L (ref 97–108)
CK SERPL-CCNC: 4753 U/L (ref 39–308)
CO2 SERPL-SCNC: 28 MMOL/L (ref 21–32)
CREAT SERPL-MCNC: 1.17 MG/DL (ref 0.7–1.3)
GLUCOSE SERPL-MCNC: 91 MG/DL (ref 65–100)
POTASSIUM SERPL-SCNC: 3.8 MMOL/L (ref 3.5–5.1)
SODIUM SERPL-SCNC: 143 MMOL/L (ref 136–145)

## 2018-05-07 PROCEDURE — 65220000003 HC RM SEMIPRIVATE PSYCH

## 2018-05-07 PROCEDURE — 82550 ASSAY OF CK (CPK): CPT | Performed by: NURSE PRACTITIONER

## 2018-05-07 PROCEDURE — 36415 COLL VENOUS BLD VENIPUNCTURE: CPT | Performed by: NURSE PRACTITIONER

## 2018-05-07 PROCEDURE — 74011250637 HC RX REV CODE- 250/637: Performed by: PSYCHIATRY & NEUROLOGY

## 2018-05-07 PROCEDURE — 80048 BASIC METABOLIC PNL TOTAL CA: CPT | Performed by: NURSE PRACTITIONER

## 2018-05-07 PROCEDURE — 74011250636 HC RX REV CODE- 250/636: Performed by: NURSE PRACTITIONER

## 2018-05-07 RX ADMIN — FLUPHENAZINE HYDROCHLORIDE 5 MG: 5 TABLET, FILM COATED ORAL at 21:11

## 2018-05-07 RX ADMIN — FLUPHENAZINE HYDROCHLORIDE 5 MG: 5 TABLET, FILM COATED ORAL at 09:01

## 2018-05-07 RX ADMIN — Medication 10 ML: at 13:31

## 2018-05-07 RX ADMIN — SODIUM CHLORIDE 500 ML: 900 INJECTION, SOLUTION INTRAVENOUS at 19:22

## 2018-05-07 RX ADMIN — SODIUM CHLORIDE 500 ML: 900 INJECTION, SOLUTION INTRAVENOUS at 13:22

## 2018-05-07 RX ADMIN — Medication 10 ML: at 05:49

## 2018-05-07 RX ADMIN — SODIUM CHLORIDE 500 ML: 900 INJECTION, SOLUTION INTRAVENOUS at 01:06

## 2018-05-07 RX ADMIN — Medication 10 ML: at 21:13

## 2018-05-07 RX ADMIN — SODIUM CHLORIDE 500 ML: 900 INJECTION, SOLUTION INTRAVENOUS at 06:33
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
## 2018-05-07 NOTE — PROGRESS NOTES
Problem: Falls - Risk of  Goal: *Absence of Falls  Document Kenisha Fall Risk and appropriate interventions in the flowsheet. Outcome: Progressing Towards Goal  Fall Risk Interventions:       Mentation Interventions: Adequate sleep, hydration, pain control, Reorient patient, More frequent rounding    Medication Interventions: Teach patient to arise slowly                  Problem: Altered Thought Process (Adult/Pediatric)  Goal: *STG: Remains safe in hospital  Outcome: Progressing Towards Goal  q15min rounds in place, pt in bed awake resting, gripper socks on.  Will continue to monitor closely  Goal: *STG: Decreased hallucinations  Outcome: Progressing Towards Goal  Pt denies A/VH

## 2018-05-07 NOTE — BH NOTES
EEG plans to come around 1100 if pt will cooperate with test.    0839- Ibrahim TDO Hearing team to come today.
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
## 2018-05-07 NOTE — PROGRESS NOTES
Problem: Falls - Risk of  Goal: *Absence of Falls  Document Kenisha Fall Risk and appropriate interventions in the flowsheet. Outcome: Progressing Towards Goal  Fall Risk Interventions:  Patient has been fall free since arriving on the Acute Unit.   Patient is currently sleeping no stress noted       Mentation Interventions: Adequate sleep, hydration, pain control, Reorient patient, More frequent rounding

## 2018-05-07 NOTE — BH NOTES
GROUP THERAPY PROGRESS NOTE    Luisana Mahmood is participating in [place]. Group time: 15 minutes    Personal goal for participation: Walking the right path with God. Goal orientation: personal    Group therapy participation: active    Therapeutic interventions reviewed and discussed: Writer listened attentively and explained unit routine. Impression of participation: Patient participated actively.

## 2018-05-07 NOTE — PROGRESS NOTES
Problem: Falls - Risk of  Goal: *Absence of Falls  Document Kenisha Fall Risk and appropriate interventions in the flowsheet. Outcome: Progressing Towards Goal  Fall Risk Interventions:       Mentation Interventions: Adequate sleep, hydration, pain control, Reorient patient, More frequent rounding    Medication Interventions: Teach patient to arise slowly                  Problem: Altered Thought Process (Adult/Pediatric)  Goal: *STG: Remains safe in hospital  Outcome: Progressing Towards Goal  Pt denies any suicidal or homicidal thoughts. Contracts for safety. Remains on q 15 min safety checks. Goal: *STG: Decreased delusional thinking  Variance: Patient slowly responding  Comments: Pt continues to have some paranoia. Voiced he trusts the treatment team, his dad, however does not trust his mom or girlfriend. Pt was walking around with his bible. Stating he gets inspiration reading it. Pt requests to meet with rosy. Message left for rosy to see. Goal: *STG: Decreased hallucinations  Pt denies any AH/VH. Pt has outstanding CT scan, Dr. Yaw Cespedes voiced the CT can wait until pt is less of a flight risk. Plan for EEG today if pt will cooperate. Pt was cooperative during treatment team this am. Pt continues to get IVF as scheduled and CK continues to trend down.

## 2018-05-07 NOTE — INTERDISCIPLINARY ROUNDS
Behavioral Health Interdisciplinary Rounds     Patient Name: Nancy Spain  Age: 33 y.o. Room/Bed:  730/01  Primary Diagnosis: Psychosis   Admission Status: TDO     Readmission within 30 days: no  Power of  in place: no  Patient requires a blocked bed: no          Reason for blocked bed:     VTE Prophylaxis: No    Mobility needs/Fall risk: no    Nutritional Plan: no  Consults:          Labs/Testing due today?: no    Sleep hours: 7.5       Participation in Care/Groups:  no  Medication Compliant?: Yes  PRNS (last 24 hours): Antipsychotic (IM) and Antianxiety    Restraints (last 24 hours):  no     CIWA (range last 24 hours):     COWS (range last 24 hours):      Alcohol screening (AUDIT) completed -   AUDIT Score: 0     If applicable, date SBIRT discussed in treatment team AND documented:     Tobacco - patient is a smoker: Have You Used Tobacco in the Past 30 Days: No  Illegal Drugs use: Have You Used Any Illegal Substances Over the Past 12 Months: Yes    24 hour chart check complete: Yes     Patient goal(s) for today:   Treatment team focus/goals: Plan to have his hearing today. Plan to titrate his medications. Progress note He remains paranoid and guarded. Stated he slept better last night. He did get IV fluids yesterday.      LOS:  2  Expected LOS: TBD     Financial concerns/prescription coverage:  No benefits   Date of last family contact:       Family requesting physician contact today:  no  Discharge plan: he will return home when ready for discharge   Guns in the home: no         Outpatient provider(s): [place] [phone]     Participating treatment team members: Luke Nathan SW - Dr. Ginny Harper - Eual Richard, RN

## 2018-05-07 NOTE — PROGRESS NOTES
Problem: Falls - Risk of  Goal: *Absence of Falls  Document Kenisha Fall Risk and appropriate interventions in the flowsheet. Outcome: Progressing Towards Goal  Fall Risk Interventions:  Patient has been fall free since arriving on the Acute Unit. Ramiro Garibay He is currently sleeping, no stress noted.        Mentation Interventions: Adequate sleep, hydration, pain control, Reorient patient, More frequent rounding    Medication Interventions: Teach patient to arise slowly

## 2018-05-07 NOTE — BH NOTES
GROUP THERAPY PROGRESS NOTE    Luke Sol is participating in [place]. Group time: 30 minutes    Personal goal for participation: unit orientation and daily progress    Goal orientation: personal    Group therapy participation: active    Therapeutic interventions reviewed and discussed: Videos on Youtube    Impression of participation: Seems in fair spirits. Pleasant upon approach. Socializing well with peers.

## 2018-05-07 NOTE — PROGRESS NOTES
Spiritual Care Assessment/Progress Note  [facility]      NAME: Rhea Iqabl      MRN: [id number]  AGE: 33 y.o.  SEX: male  Lutheran Affiliation: No preference   Language: English     5/7/2018     Total Time (in minutes): 24     Spiritual Assessment begun in [facility] through conversation with:         [x]Patient        [] Family    [] Friend(s)        Reason for Consult: Request by patient     Spiritual beliefs: (Please include comment if needed)     [] Identifies with a benito tradition:     [] Supported by a benito community:      [] Claims no spiritual orientation:      [] Seeking spiritual identity:           [x] Adheres to an individual form of spirituality:      [] Not able to assess:                     Identified resources for coping:      [] Prayer                               [] Music                  [] Guided Imagery     [x] Family/friends                 [] Pet visits     [] Devotional reading                         [] Unknown     [x] Other: THC                                           Interventions offered during this visit: (See comments for more details)    Patient Interventions: Affirmation of emotions/emotional suffering, Catharsis/review of pertinent events in supportive environment, Coping skills reviewed/reinforced, Iconic (affirming the presence of God/Higher Power), Normalization of emotional/spiritual concerns, Prayer (actual), Lutheran beliefs/image of God discussed           Plan of Care:     [] Support spiritual and/or cultural needs    [] Support AMD and/or advance care planning process      [] Support grieving process   [] Coordinate Rites and/or Rituals    [] Coordination with community clergy   [] No spiritual needs identified at this time   [] Detailed Plan of Care below (See Comments)  [] Make referral to Music Therapy  [] Make referral to Pet Therapy     [] Make referral to Addiction services  [] Make referral to [facility]  [] Make referral to Spiritual Care Partner  [] No future visits requested        [x] Follow up visits as needed     Comments:  Responded to request from patient for prayer. Arrived in Acute BH and engaged Mr. Uribe Abo Kenia Robert) in dialogue where he described how he came to be hospitalized. Found him to be welcoming of pastoral support and transparent in conversation. Has been regular heavy user of THC and alcohol as coping substances. Is unable to identify a turning point that led to substance abuse. Is motivated to discern and develop a more disciplined lifestyle for his own sense of self worth and well being. Also desires to be a better influence on his children. Though he went to [employer] as a child and his mother still attends---is without a Christianity family or spiritual practice. Provided words of affirmation and encouragement for his stated intent to seek sobriety. Encouraged him to surround himself with sober people and spend time with his children. Also suggested becoming active in a benito community to support his spiritual growth. Shared spoken prayer and assured of  availability as needed. [facility]'s Staff  (Tiffany Roy Patient Care Specialist)   Paging Service [phone])

## 2018-05-07 NOTE — INTERDISCIPLINARY ROUNDS
Behavioral Health Interdisciplinary Rounds     Patient Name: Liat Montez  Age: 33 y.o. Room/Bed:  730/  Primary Diagnosis: Psychosis   Admission Status: TDO     Readmission within 30 days: no  Power of  in place: no  Patient requires a blocked bed: no          Reason for blocked bed:     VTE Prophylaxis: No    Mobility needs/Fall risk: no    Nutritional Plan: no  Consults:          Labs/Testing due today?: no    Sleep hours:        Participation in Care/Groups:  no  Medication Compliant?: Yes  PRNS (last 24 hours):  Antipsychotic (IM) and Antianxiety    Restraints (last 24 hours):  no     CIWA (range last 24 hours):     COWS (range last 24 hours):      Alcohol screening (AUDIT) completed -   AUDIT Score: 0     If applicable, date SBIRT discussed in treatment team AND documented:     Tobacco - patient is a smoker: Have You Used Tobacco in the Past 30 Days: No  Illegal Drugs use: Have You Used Any Illegal Substances Over the Past 12 Months: Yes    24 hour chart check complete: yes     Patient goal(s) for today:   Treatment team focus/goals:   Progress note     LOS:  2  Expected LOS:     Financial concerns/prescription coverage:  no  Date of last family contact:       Family requesting physician contact today:  no  Discharge plan:   Guns in the home:         Outpatient provider(s):     Participating treatment team members: Liat Montez, * (assigned SW),

## 2018-05-08 ENCOUNTER — HOSPITAL ENCOUNTER (OUTPATIENT)
Dept: CT IMAGING | Age: 33
Discharge: HOME | End: 2018-05-08
Attending: PSYCHIATRY & NEUROLOGY
Payer: SELF-PAY

## 2018-05-08 LAB
ANION GAP SERPL CALC-SCNC: 7 MMOL/L (ref 5–15)
BUN SERPL-MCNC: 9 MG/DL (ref 6–20)
BUN/CREAT SERPL: 8 (ref 12–20)
CALCIUM SERPL-MCNC: 8.9 MG/DL (ref 8.5–10.1)
CHLORIDE SERPL-SCNC: 109 MMOL/L (ref 97–108)
CK SERPL-CCNC: 2777 U/L (ref 39–308)
CO2 SERPL-SCNC: 26 MMOL/L (ref 21–32)
CREAT SERPL-MCNC: 1.09 MG/DL (ref 0.7–1.3)
GLUCOSE SERPL-MCNC: 91 MG/DL (ref 65–100)
POTASSIUM SERPL-SCNC: 3.5 MMOL/L (ref 3.5–5.1)
SODIUM SERPL-SCNC: 142 MMOL/L (ref 136–145)

## 2018-05-08 PROCEDURE — 82550 ASSAY OF CK (CPK): CPT

## 2018-05-08 PROCEDURE — 36415 COLL VENOUS BLD VENIPUNCTURE: CPT

## 2018-05-08 PROCEDURE — 74011250637 HC RX REV CODE- 250/637: Performed by: NURSE PRACTITIONER

## 2018-05-08 PROCEDURE — 74011250636 HC RX REV CODE- 250/636: Performed by: NURSE PRACTITIONER

## 2018-05-08 PROCEDURE — 70450 CT HEAD/BRAIN W/O DYE: CPT

## 2018-05-08 PROCEDURE — 80048 BASIC METABOLIC PNL TOTAL CA: CPT

## 2018-05-08 PROCEDURE — 74011250637 HC RX REV CODE- 250/637: Performed by: PSYCHIATRY & NEUROLOGY

## 2018-05-08 PROCEDURE — 65220000003 HC RM SEMIPRIVATE PSYCH

## 2018-05-08 RX ORDER — AMLODIPINE BESYLATE 5 MG/1
5 TABLET ORAL DAILY
Status: DISCONTINUED | OUTPATIENT
Start: 2018-05-08 | End: 2018-05-09

## 2018-05-08 RX ADMIN — ZOLPIDEM TARTRATE 10 MG: 10 TABLET ORAL at 21:11

## 2018-05-08 RX ADMIN — AMLODIPINE BESYLATE 5 MG: 5 TABLET ORAL at 14:17

## 2018-05-08 RX ADMIN — Medication 10 ML: at 06:03

## 2018-05-08 RX ADMIN — Medication 10 ML: at 21:12

## 2018-05-08 RX ADMIN — FLUPHENAZINE HYDROCHLORIDE 5 MG: 5 TABLET, FILM COATED ORAL at 08:49

## 2018-05-08 RX ADMIN — SODIUM CHLORIDE 500 ML: 900 INJECTION, SOLUTION INTRAVENOUS at 13:07

## 2018-05-08 RX ADMIN — SODIUM CHLORIDE 500 ML: 900 INJECTION, SOLUTION INTRAVENOUS at 19:19

## 2018-05-08 RX ADMIN — SODIUM CHLORIDE 500 ML: 900 INJECTION, SOLUTION INTRAVENOUS at 00:47

## 2018-05-08 RX ADMIN — Medication 10 ML: at 00:47

## 2018-05-08 RX ADMIN — Medication 10 ML: at 14:08

## 2018-05-08 RX ADMIN — FLUPHENAZINE HYDROCHLORIDE 5 MG: 5 TABLET, FILM COATED ORAL at 21:11

## 2018-05-08 NOTE — BH NOTES
GROUP THERAPY PROGRESS NOTE    The patient Ana Champion is participating in [employer]. Group time: 30 minutes    Personal goal for participation: to orient the patient to the unit.     Goal orientation: successful adoption of unit rules    Group therapy participation: active    Therapeutic interventions reviewed and discussed: Yes    Impression of participation:     Ophelia Screen  5/8/2018 9:20 AM

## 2018-05-08 NOTE — BH NOTES
PSYCHIATRIC PROGRESS NOTE         Patient Name  Tamica Dubon   Date of Birth 1985   [facility] [id number]   Medical Record Number  [id number]      Age  33 y.o. PCP None   Admit date:  5/5/2018    Room Number  730/01  @ [facility]   Date of Service  5/8/2018          PSYCHOTHERAPY SESSION NOTE:  Length of psychotherapy session: 20 minutes    Main condition/diagnosis/issues treated during session today, 5/8/2018 : Jain preoccupation, delusions, bizarre behavior    I employed Cognitive Behavioral therapy techniques, Reality-Oriented psychotherapy, as well as supportive psychotherapy in regards to various ongoing psychosocial stressors, including the following: pre-admission and current problems; housing issues; occupational issues;  medical issues; and stress of hospitalization. Interpersonal relationship issues and psychodynamic conflicts explored. Attempts made to alleviate maladaptive patterns. We, also, worked on issues of denial & effects of substance dependency/use     Overall, patient is progressing    Treatment Plan Update (reviewed an updated 5/8/2018) : I will modify psychotherapy tx plan by implementing more stress management strategies, building upon cognitive behavioral techniques, increasing coping skills, as well as shoring up psychological defenses). E & M PROGRESS NOTE:         HISTORY       CC:  \"I feel like people trying to kill me\"  HISTORY OF PRESENT ILLNESS/INTERVAL HISTORY:  (reviewed/updated 5/8/2018). per initial evaluation:   The patient, Tamica Dubon, is a 33 y.o. BLACK OR  male without a past psychiatric history who  presents at this time with complaints of (and/or evidence of) the following emotional symptoms: agitation, psychotic behavior and paranoid behavior. The patient awoke yesterday with \"feeling weird\", seeing faces and demons.  His mother was taking him to the hospital for an psychiatric evaluation, but patient jumped out of the car. He suffered a few lacerations, scrapes, etc. At the hospital he required multiple prns including ketamine. The patient has some recall of the events although limited, but he feels now improved. No restraints or prns needed this morning. He smokes up to 7 cigars with marijuana on a daily basis.     He reports some feelings of depression recently, since he was laid off in February. Poor sleep at night. (+)worrying a lot about finding work. His girlfriend reported that he had several recent  incidents of \"getting stuck\"- staring, unresponsive. He describes chronic feelings of vague paranoia, conspiracy theories, songs and videos getting stuck in his head. Denies suicidal thoughts. He spends his day at home with the 3year old.      Sx were severe on admission, not improving. Macarena Acevedo presents/reports/evidences the following emotional symptoms today, 5/8/2018:delusions, psychotic behavior and paranoid behavior. The above symptoms have been present for several weeks to months. These symptoms are of high severity. The symptoms are intermittent in nature. The patient has not had any repeat incidents of agitation or violence on the unit. Refused IVF today. Less paranoid. He enjoyed his visit with his family. SIDE EFFECTS: (reviewed/updated 5/8/2018)  None reported or admitted to. ALLERGIES:(reviewed/updated 5/8/2018)  No Known Allergies   MEDICATIONS PRIOR TO ADMISSION:(reviewed/updated 5/8/2018)  No prescriptions prior to admission. PAST MEDICAL HISTORY: Past medical history from the initial psychiatric evaluation has been reviewed (reviewed/updated 5/8/2018) with no additional updates (I asked patient and no additional past medical history provided). No past medical history on file. No past surgical history on file.    SOCIAL HISTORY: Social history from the initial psychiatric evaluation has been reviewed (reviewed/updated 5/8/2018) with no additional updates (I asked patient and no additional social history provided). Social History     Social History    Marital status: UNKNOWN     Spouse name: N/A    Number of children: N/A    Years of education: N/A     Occupational History    Not on file. Social History Main Topics    Smoking status: Not on file    Smokeless tobacco: Not on file    Alcohol use Not on file    Drug use: Not on file    Sexual activity: Not on file     Other Topics Concern    Not on file     Social History Narrative      FAMILY HISTORY: Family history from the initial psychiatric evaluation has been reviewed (reviewed/updated 5/8/2018) with no additional updates (I asked patient and no additional family history provided). No family history on file. REVIEW OF SYSTEMS: (reviewed/updated 5/8/2018)  Appetite:good   Sleep: good   All other Review of Systems: Negative paranoia, anxiety, confusion         [facility] (MSE):    MSE FINDINGS ARE WITHIN NORMAL LIMITS (WNL) UNLESS OTHERWISE STATED BELOW. ( ALL OF THE BELOW CATEGORIES OF THE MSE HAVE BEEN REVIEWED (reviewed 5/8/2018) AND UPDATED AS DEEMED APPROPRIATE )  General Presentation age appropriate and casually dressed, cooperative   Orientation oriented to time, place and person   Vital Signs  See below (reviewed 5/8/2018); Vital Signs (BP, Pulse, & Temp) are within normal limits if not listed below.    Gait and Station Stable/steady, no ataxia   Musculoskeletal System No extrapyramidal symptoms (EPS); no abnormal muscular movements or Tardive Dyskinesia (TD); muscle strength and tone are within normal limits   Language No aphasia or dysarthria   Speech:  normal pitch and normal volume   Thought Processes More logical;  normal rate of thoughts; fair abstract reasoning/computation   Thought Associations goal directed   Thought Content (+)paranoia   Suicidal Ideations none   Homicidal Ideations none   Mood:  euthymic   Affect:  mood-congruent; improved range; less suspicious   Memory recent  good   Memory remote:  good   Concentration/Attention:  wnl   Fund of Knowledge wnl   Insight:  limited   Reliability fair   Judgment:  limited          VITALS:     Patient Vitals for the past 24 hrs:   Temp Pulse Resp BP SpO2   05/08/18 1225 98.6 °F (37 °C) 70 16 (!) 161/94 -   05/08/18 0759 98.1 °F (36.7 °C) 68 16 146/88 97 %   05/07/18 2056 98.1 °F (36.7 °C) 81 16 (!) 148/95 -     Wt Readings from Last 3 Encounters:   05/05/18 86.2 kg (190 lb)     Temp Readings from Last 3 Encounters:   05/08/18 98.6 °F (37 °C)     BP Readings from Last 3 Encounters:   05/08/18 (!) 161/94     Pulse Readings from Last 3 Encounters:   05/08/18 70            DATA     LABORATORY DATA:(reviewed/updated 5/8/2018)  Recent Results (from the past 24 hour(s))   CK    Collection Time: 05/08/18  6:36 AM   Result Value Ref Range    CK 2777 (H) 39 - 096 U/L   METABOLIC PANEL, BASIC    Collection Time: 05/08/18  6:36 AM   Result Value Ref Range    Sodium 142 136 - 145 mmol/L    Potassium 3.5 3.5 - 5.1 mmol/L    Chloride 109 (H) 97 - 108 mmol/L    CO2 26 21 - 32 mmol/L    Anion gap 7 5 - 15 mmol/L    Glucose 91 65 - 100 mg/dL    BUN 9 6 - 20 MG/DL    Creatinine 1.09 0.70 - 1.30 MG/DL    BUN/Creatinine ratio 8 (L) 12 - 20      GFR est AA >60 >60 ml/min/1.73m2    GFR est non-AA >60 >60 ml/min/1.73m2    Calcium 8.9 8.5 - 10.1 MG/DL     No results found for: VALF2, VALAC, VALP, VALPR, DS6, CRBAM, CRBAMP, CARB2, XCRBAM  No results found for: LITHM   RADIOLOGY REPORTS:(reviewed/updated 5/8/2018)  No results found.        MEDICATIONS     ALL MEDICATIONS:   Current Facility-Administered Medications   Medication Dose Route Frequency    amLODIPine (NORVASC) tablet 5 mg  5 mg Oral DAILY    sodium chloride (NS) flush 5-10 mL  5-10 mL IntraVENous Q8H    fluPHENAZine (PROLIXIN) tablet 5 mg  5 mg Oral BID    sodium chloride 0.9 % bolus infusion 500 mL  500 mL IntraVENous Q6H    ziprasidone (GEODON) 20 mg in sterile water (preservative free) 1 mL injection  20 mg IntraMUSCular BID PRN    OLANZapine (ZyPREXA) tablet 5 mg  5 mg Oral Q6H PRN    benztropine (COGENTIN) tablet 2 mg  2 mg Oral BID PRN    benztropine (COGENTIN) injection 2 mg  2 mg IntraMUSCular BID PRN    LORazepam (ATIVAN) injection 2 mg  2 mg IntraMUSCular Q4H PRN    LORazepam (ATIVAN) tablet 1 mg  1 mg Oral Q4H PRN    zolpidem (AMBIEN) tablet 10 mg  10 mg Oral QHS PRN    acetaminophen (TYLENOL) tablet 650 mg  650 mg Oral Q4H PRN    ibuprofen (MOTRIN) tablet 400 mg  400 mg Oral Q8H PRN    magnesium hydroxide (MILK OF MAGNESIA) 400 mg/5 mL oral suspension 30 mL  30 mL Oral DAILY PRN    nicotine (NICODERM CQ) 21 mg/24 hr patch 1 Patch  1 Patch TransDERmal DAILY PRN    diphenhydrAMINE (BENADRYL) injection 50 mg  50 mg IntraMUSCular ONCE PRN    haloperidol lactate (HALDOL) injection 5 mg  5 mg IntraMUSCular ONCE PRN      SCHEDULED MEDICATIONS:   Current Facility-Administered Medications   Medication Dose Route Frequency    amLODIPine (NORVASC) tablet 5 mg  5 mg Oral DAILY    sodium chloride (NS) flush 5-10 mL  5-10 mL IntraVENous Q8H    fluPHENAZine (PROLIXIN) tablet 5 mg  5 mg Oral BID    sodium chloride 0.9 % bolus infusion 500 mL  500 mL IntraVENous Q6H          ASSESSMENT & PLAN     DIAGNOSES REQUIRING ACTIVE TREATMENT AND MONITORING: (reviewed/updated 5/8/2018)  Patient Active Hospital Problem List:   Psychosis (5/5/2018)    Assessment: substance induced vs. Schizophrenia vs. MDD with psychosis    Plan:continue to rule out medical causes   Increase prolixin to 5mg BID  Encourage compliance with medications, unit rules, etc.  Provided supportive psychotherapy    Rhabdomyolysis  Assessment: jumping from moving car, aggressive behavior in ED and on the unit, IM injections  Plan: appreciate input from hospitalist  Provided supportive psychotherapy  Encourage compliance with IVF and oral hydration  Monitor CK, CRT          I will continue to monitor blood levels (Depakote, Tegretol, lithium, clozapine---a drug with a narrow therapeutic index= NTI) and associated labs for drug therapy implemented that require intense monitoring for toxicity as deemed appropriate based on current medication side effects and pharmacodynamically determined drug 1/2 lives. In summary, Liat Montez, is a 33 y.o.  male who presents with a severe exacerbation of the principal diagnosis of Psychosis  Patient's condition is  improving. Patient requires continued inpatient hospitalization for further stabilization, safety monitoring and medication management. I will continue to coordinate the provision of individual, milieu, occupational, group, and substance abuse therapies to address target symptoms/diagnoses as deemed appropriate for the individual patient. A coordinated, multidisplinary treatment team round was conducted with the patient (this team consists of the nurse, psychiatric unit pharmcist,  and writer). Complete current electronic health record for patient has been reviewed today including consultant notes, ancillary staff notes, nurses and psychiatric tech notes. Suicide risk assessment completed and patient deemed to be of low risk for suicide at this time. The following regarding medications was addressed during rounds with patient:   the risks and benefits of the proposed medication. The patient was given the opportunity to ask questions. Informed consent given to the use of the above medications. Will continue to adjust psychiatric and non-psychiatric medications (see above \"medication\" section and orders section for details) as deemed appropriate & based upon diagnoses and response to treatment. I will continue to order blood tests/labs and diagnostic tests as deemed appropriate and review results as they become available (see orders for details and above listed lab/test results).     I will order psychiatric records from previous [facility] hospitals to further elucidate the nature of patient's psychopathology and review once available. I will gather additional collateral information from friends, family and o/p treatment team to further elucidate the nature of patient's psychopathology and baselline level of psychiatric functioning. I certify that this patient's inpatient psychiatric hospital services furnished since the previous certification were, and continue to be, required for treatment that could reasonably be expected to improve the patient's condition, or for diagnostic study, and that the patient continues to need, on a daily basis, active treatment furnished directly by or requiring the supervision of inpatient psychiatric facility personnel. In addition, the hospital records show that services furnished were intensive treatment services, admission or related services, or equivalent services.     EXPECTED DISCHARGE DATE/DAY: TBD     DISPOSITION: Home       Signed By:   Nuris Mendez MD  5/8/2018

## 2018-05-08 NOTE — PROGRESS NOTES
Problem: Altered Thought Process (Adult/Pediatric)  Goal: *STG: Remains safe in hospital  Outcome: Progressing Towards Goal  Pt denies any suicidal or homicidal thoughts. Contracts for safety. Remains on q 15 min safety checks. Goal: *STG: Attends activities and groups  Outcome: Progressing Towards Goal  Group attendance and participation has improved today. Goal: *STG: Decreased delusional thinking  Outcome: Progressing Towards Goal  No delusional content expressed. Expression has been reality based. Goal: *STG: Decreased hallucinations  Outcome: Progressing Towards Goal  Denies AV/VH. Has been observed interacting with peers appropriately.
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
## 2018-05-08 NOTE — PROGRESS NOTES
Problem: Falls - Risk of  Goal: *Absence of Falls  Document Kenisha Fall Risk and appropriate interventions in the flowsheet. Outcome: Progressing Towards Goal  Fall Risk Interventions: Has been free of falls   Lying quietly in bed with eyes closed , respirations even and unlabored , NAD noted   Q15 min safety rounds continue , night light on for safety        Mentation Interventions: Adequate sleep, hydration, pain control, Reorient patient, More frequent rounding    Medication Interventions: Teach patient to arise slowly          0055 - Pt refused IV fluids bolus , \" I don't need it \" , I just don't think I need it \"  Pt teaching done   States he will drink fluids   0645- Refused IV fluids , cooperative with labs , saline lock pended and flushed with out difficulty .  Had 400 ml of po fluids

## 2018-05-08 NOTE — BH NOTES
GROUP THERAPY PROGRESS NOTE    Macarena Acevedo participated in the Acute Unit's afternoon Process Group for yesterday, with a focus on identifying feelings, planning for the rest of the day, and preparing for discharge. Group time: 45 minutes. Personal goal for participation: To increase the capacity to improve ones mood and structure. Goal orientation: The patient will be able to identify their feelings, develop a plan for structuring their day, and discharge planning. Group therapy participation: With prompting, this patient participated in the group. Therapeutic interventions reviewed and discussed: The group members were asked to introduce themselves to each other and to see if they could identify an emotion they are having and/or let the group know what they want to focus on for the day as they continue to make discharge plans. Impression of participation: The patient said he was feeling \"real good\" and that he had been visited by his mother. He indicated he was pleased to have the support of his family and that he had been reading his Bible. He was alert and generally oriented. He expressed no current SI/HI and displayed no overt psychotic symptoms in this group, although if he were paranoid, it could not be verified in this group. He was interrupted for a couple of minutes while a nurse helped start an IV drip for him. His affect was slightly inappropriate, smiling and anxious. His mood matched his affect and was slightly guarded. He owned some Yarsani pre-occupation.

## 2018-05-08 NOTE — BH NOTES
LEISURE GROUP THERAPY PROGRESS NOTE    The patient Arnulfo miller 33 y.o. male is participating in Leisure Group. Group time: 45 minutes    Personal goal for participation: Daily social interactions with other peers & staff.     Goal orientation: Relaxation activities    Group therapy participation: active    Therapeutic interventions reviewed and discussed:  Yes    Impression of participation: Active    Neva Ash  5/7/2018  9:30 PM

## 2018-05-08 NOTE — INTERDISCIPLINARY ROUNDS
Behavioral Health Interdisciplinary Rounds     Patient Name: Aissatou Garcia  Age: 33 y.o. Room/Bed:  730/  Primary Diagnosis: Psychosis   Admission Status:  voluntary    Readmission within 30 days: no  Power of  in place: no  Patient requires a blocked bed: no          Reason for blocked bed:     VTE Prophylaxis: No    Mobility needs/Fall risk: no    Nutritional Plan: no  Consults:       Labs/Testing due today?: no    Sleep hours:  7       Participation in Care/Groups:  yes  Medication Compliant?: No - refused IV fluids   PRNS (last 24 hours): None    Restraints (last 24 hours):  no     CIWA (range last 24 hours):     COWS (range last 24 hours):      Alcohol screening (AUDIT) completed -   AUDIT Score: 0     If applicable, date SBIRT discussed in treatment team AND documented:     Tobacco - patient is a smoker: Have You Used Tobacco in the Past 30 Days: No  Illegal Drugs use: Have You Used Any Illegal Substances Over the Past 12 Months: Yes    24 hour chart check complete: yes     Patient goal(s) for today:   Treatment team focus/goals: Plan to titrate his medications. Progress note - He has been doing better on the unit. He visited last with his family. Visits went well.     LOS:  3  Expected LOS: TBD     Financial concerns/prescription coverage:  [address] [place] [address]   Date of last family contact:      Family requesting physician contact today:   Discharge plan: he will return home with his family   Guns in the home: no      Outpatient provider(s):[address]   Participating treatment team members: Colby Roa Dr., RN

## 2018-05-09 LAB
ANION GAP SERPL CALC-SCNC: 8 MMOL/L (ref 5–15)
BUN SERPL-MCNC: 11 MG/DL (ref 6–20)
BUN/CREAT SERPL: 10 (ref 12–20)
CALCIUM SERPL-MCNC: 8.9 MG/DL (ref 8.5–10.1)
CHLORIDE SERPL-SCNC: 106 MMOL/L (ref 97–108)
CK SERPL-CCNC: 1505 U/L (ref 39–308)
CO2 SERPL-SCNC: 28 MMOL/L (ref 21–32)
CREAT SERPL-MCNC: 1.11 MG/DL (ref 0.7–1.3)
GLUCOSE SERPL-MCNC: 87 MG/DL (ref 65–100)
POTASSIUM SERPL-SCNC: 4 MMOL/L (ref 3.5–5.1)
SODIUM SERPL-SCNC: 142 MMOL/L (ref 136–145)

## 2018-05-09 PROCEDURE — 74011250637 HC RX REV CODE- 250/637: Performed by: NURSE PRACTITIONER

## 2018-05-09 PROCEDURE — 82550 ASSAY OF CK (CPK): CPT

## 2018-05-09 PROCEDURE — 74011250636 HC RX REV CODE- 250/636: Performed by: NURSE PRACTITIONER

## 2018-05-09 PROCEDURE — 74011250637 HC RX REV CODE- 250/637: Performed by: PSYCHIATRY & NEUROLOGY

## 2018-05-09 PROCEDURE — 65220000003 HC RM SEMIPRIVATE PSYCH

## 2018-05-09 PROCEDURE — 36415 COLL VENOUS BLD VENIPUNCTURE: CPT

## 2018-05-09 PROCEDURE — 80048 BASIC METABOLIC PNL TOTAL CA: CPT

## 2018-05-09 RX ORDER — AMLODIPINE BESYLATE 5 MG/1
10 TABLET ORAL DAILY
Status: DISCONTINUED | OUTPATIENT
Start: 2018-05-10 | End: 2018-05-11 | Stop reason: HOSPADM

## 2018-05-09 RX ADMIN — FLUPHENAZINE HYDROCHLORIDE 5 MG: 5 TABLET, FILM COATED ORAL at 08:31

## 2018-05-09 RX ADMIN — SODIUM CHLORIDE 500 ML: 900 INJECTION, SOLUTION INTRAVENOUS at 13:13

## 2018-05-09 RX ADMIN — AMLODIPINE BESYLATE 5 MG: 5 TABLET ORAL at 08:31

## 2018-05-09 RX ADMIN — FLUPHENAZINE HYDROCHLORIDE 5 MG: 5 TABLET, FILM COATED ORAL at 21:04

## 2018-05-09 RX ADMIN — Medication 10 ML: at 05:28

## 2018-05-09 RX ADMIN — SODIUM CHLORIDE 500 ML: 900 INJECTION, SOLUTION INTRAVENOUS at 01:00

## 2018-05-09 RX ADMIN — Medication 10 ML: at 13:12

## 2018-05-09 NOTE — PROGRESS NOTES
Problem: Altered Thought Process (Adult/Pediatric)  Goal: *STG: Remains safe in hospital  Outcome: Progressing Towards Goal  Pt remains safe on the unit and interacting with select peers. Visible on the unit for small periods of time. Goal: *STG: Attends activities and groups  Outcome: Progressing Towards Goal  Encouraged to attend the groups and express feelings and concerns. Goal: *STG: Decreased delusional thinking  Outcome: Progressing Towards Goal  Thought process less disorganized at this time.

## 2018-05-09 NOTE — BH NOTES
GROUP THERAPY PROGRESS NOTE    Chava Colemantessyyara is participating in Reflections. Group time: 10 minutes    Personal goal for participation: unit orientation and daily progress    Goal orientation: personal    Group therapy participation: active    Therapeutic interventions reviewed and discussed: yes    Impression of participation: Seems in fair spirits. Active in group discussion.

## 2018-05-09 NOTE — PROGRESS NOTES
Problem: Altered Thought Process (Adult/Pediatric)  Goal: *STG: Remains safe in hospital  Outcome: Progressing Towards Goal  Patient is resting quietly in bed. Awake and alert. Calm and cooperative. Nom distress noted. Will continue to monitor.

## 2018-05-09 NOTE — BH NOTES
TRANSFER - OUT REPORT:    Verbal report given to Ulysses Driscoll RN(name) on Aissatou Garcia  being transferred to [facility](unit) for routine progression of care       Report consisted of patients Situation, Background, Assessment and   Recommendations(SBAR). Information from the following report(s) SBAR was reviewed with the receiving nurse. Lines:   Peripheral IV 05/06/18 (Active)   Site Assessment Clean, dry, & intact 5/7/2018  8:05 AM   Phlebitis Assessment 0 5/7/2018  8:05 AM   Infiltration Assessment 0 5/7/2018  8:05 AM   Dressing Status Clean, dry, & intact 5/7/2018  8:05 AM   Dressing Type Transparent 5/7/2018  8:05 AM   Hub Color/Line Status Capped 5/7/2018  8:05 AM        Opportunity for questions and clarification was provided.       Patient transported with:   Registered Nurse

## 2018-05-09 NOTE — PROGRESS NOTES
Problem: Altered Thought Process (Adult/Pediatric)  Goal: *STG: Decreased delusional thinking  Outcome: Progressing Towards Goal  No delusions noted this shift. Pt cooperative and polite. Visited with his wife of 11 years  this evening. Reports visit went well, and that they have 4 kids together.   IV fluids completed with no redness noted,  patent IV site noted

## 2018-05-09 NOTE — INTERDISCIPLINARY ROUNDS
Behavioral Health Interdisciplinary Rounds     Patient Name: Socorro Loja  Age: 33 y.o. Room/Bed:  730/  Primary Diagnosis: Psychosis   Admission Status: Voluntary Commitment     Readmission within 30 days: no  Power of  in place: no  Patient requires a blocked bed: no          Reason for blocked bed:     VTE Prophylaxis: No    Mobility needs/Fall risk: no    Nutritional Plan: no  Consults:          Labs/Testing due today?: no    Sleep hours:  7.5      Participation in Care/Groups:  yes  Medication Compliant?: Yes  PRNS (last 24 hours): Sleep Aid    Restraints (last 24 hours):  no     CIWA (range last 24 hours):     COWS (range last 24 hours):      Alcohol screening (AUDIT) completed -   AUDIT Score: 0     If applicable, date SBIRT discussed in treatment team AND documented:     Tobacco - patient is a smoker: Have You Used Tobacco in the Past 30 Days: No  Illegal Drugs use: Have You Used Any Illegal Substances Over the Past 12 Months: Yes    24 hour chart check complete: yes     Patient goal(s) for today:   Treatment team focus/goals: Plan to transfer to the general unit  and set up for discharge on Friday    . Progress note He has been pleasant and compliant with his treatment. He is participating in groups on the unit.       LOS:  4  Expected LOS: TBD    Financial concerns/prescription coverage: [address]   Date of last family contact SW called and left a message for his mother    Family requesting physician contact today:   Discharge plan: he will return home with his family   Guns in the home:no        Outpatient provider(s): [facility]     Participating treatment team members: Ingrid Ahmadi SW - Dr. Allean Merritts - Esta Blush, RN
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
## 2018-05-09 NOTE — PROGRESS NOTES
Problem: Falls - Risk of  Goal: *Absence of Falls  Document Kenisha Fall Risk and appropriate interventions in the flowsheet.    Outcome: Progressing Towards Goal  Fall Risk Interventions:       Mentation Interventions: Adequate sleep, hydration, pain control    Medication Interventions: Teach patient to arise slowly
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
## 2018-05-09 NOTE — PROGRESS NOTES
Problem: Falls - Risk of  Goal: *Absence of Falls  Document Kenisha Fall Risk and appropriate interventions in the flowsheet. Outcome: Progressing Towards Goal  Fall Risk Interventions:       Mentation Interventions: Adequate sleep, hydration, pain control    Medication Interventions: Teach patient to arise slowly       Resting in bed with eyes closed, no complaints, no distress noted. Safety measures in place, will continue to monitor.

## 2018-05-09 NOTE — BH NOTES
PRN Medication Documentation    Specific patient behavior that led to need for PRN medication: pt requested \"something for sleep\"  Staff interventions attempted prior to PRN being given: alternate coping mechanisms, reassurance, ligihts dimmed  PRN medication given: ambien po prn   Patient response/effectiveness of PRN medication: 35 minutes post administration patient lay quietly in bed.  NAD

## 2018-05-09 NOTE — BH NOTES
The documentation for this period is being entered following the guidelines as defined in the [facility] downtime policy by Pierre Bustos.    [id number]

## 2018-05-09 NOTE — BH NOTES
GROUP THERAPY PROGRESS NOTE    Destiny Soliz did not participate in a 45 minute Acute Unit's Process Group, with a focus identifying feelings, planning for the rest of the day, and discussing discharge. He was transferred to the General Unit prior to the start of this session. He also arrived to late on the General Unit to participate in that unit's Process Group.

## 2018-05-10 PROCEDURE — 74011250637 HC RX REV CODE- 250/637: Performed by: PSYCHIATRY & NEUROLOGY

## 2018-05-10 PROCEDURE — 74011250637 HC RX REV CODE- 250/637: Performed by: NURSE PRACTITIONER

## 2018-05-10 PROCEDURE — 65220000003 HC RM SEMIPRIVATE PSYCH

## 2018-05-10 RX ADMIN — ZOLPIDEM TARTRATE 10 MG: 10 TABLET ORAL at 20:57

## 2018-05-10 RX ADMIN — FLUPHENAZINE HYDROCHLORIDE 5 MG: 5 TABLET, FILM COATED ORAL at 08:23

## 2018-05-10 RX ADMIN — AMLODIPINE BESYLATE 10 MG: 5 TABLET ORAL at 08:23

## 2018-05-10 RX ADMIN — FLUPHENAZINE HYDROCHLORIDE 5 MG: 5 TABLET, FILM COATED ORAL at 20:56

## 2018-05-10 NOTE — BH NOTES
GROUP THERAPY PROGRESS NOTE    Mariah Rocha is participating in Discharge Planning Group    Group time: 30 minutes    Personal goal for participation: Readiness for discharge    Goal orientation: Making the Most of Your Treatment Team Meetings    Group therapy participation: active    Therapeutic interventions reviewed and discussed: Yes    Impression of participation: Engaged, exhibited leadership in his sharing / crisis and looking forward to discharge to begin to utilize new coping skills and seeking help to avoid repeat of events that lead to being hospitalized.  Pt will have a new Out pt [address] [address] team via [facility]

## 2018-05-10 NOTE — BH NOTES
GROUP THERAPY PROGRESS NOTE    Giovani Anderson is participating in reflection group. Group time: 15 minutes    Personal goal for participation: Daily progress     Goal orientation: personal    Group therapy participation: active    Therapeutic interventions reviewed and discussed: Unit rules and regulations. 99 coping skills.      Impression of participation: active

## 2018-05-10 NOTE — INTERDISCIPLINARY ROUNDS
Behavioral Health Interdisciplinary Rounds     Patient Name: Daniela Beltran  Age: 33 y.o.   Room/Bed:  729/  Primary Diagnosis: Psychosis   Admission Status: Voluntary Commitment     Readmission within 30 days: no  Power of  in place: no  Patient requires a blocked bed: no          Reason for blocked bed:     VTE Prophylaxis: No    Mobility needs/Fall risk: no    Nutritional Plan: no  Consults:       Labs/Testing due today?: no    Sleep hours:  7 hours 45 min       Participation in Care/Groups:  yes  Medication Compliant?: Yes  PRNS (last 24 hours): None    Restraints (last 24 hours):  no     CIWA (range last 24 hours):     COWS (range last 24 hours):      Alcohol screening (AUDIT) completed -   AUDIT Score: 0     If applicable, date SBIRT discussed in treatment team AND documented:     Tobacco - patient is a smoker: Have You Used Tobacco in the Past 30 Days: No  Illegal Drugs use: Have You Used Any Illegal Substances Over the Past 12 Months: Yes    24 hour chart check complete: yes     Patient goal(s) for today: attend all groups  Treatment team focus/goals: prepare for discharge tomorrow  Progress note: Patient doing well; reports looking forward to discharge tomorrow    LOS:  5  Expected LOS: 6    Financial concerns/prescription coverage:  SUSAN  Date of last family contact:    Family requesting physician contact today: No  Discharge plan: Return home  Guns in the home: No     Outpatient provider(s): [address] [place] [id number]    Participating treatment team members: Sage Harp MSW; Dr. Rolando Cary MD; Miguel Mcmahan, RN

## 2018-05-10 NOTE — PROGRESS NOTES
Problem: Falls - Risk of  Goal: *Absence of Falls  Document Kenisha Fall Risk and appropriate interventions in the flowsheet.    Outcome: Progressing Towards Goal  Fall Risk Interventions:  Lying quietly in bed with eyes closed , respirations even and unlabored , NAD noted   Q15 min safety rounds continue        Mentation Interventions: Adequate sleep, hydration, pain control    Medication Interventions: Patient to call before getting OOB

## 2018-05-10 NOTE — BH NOTES
GROUP THERAPY PROGRESS NOTE    Dio Chavez participated in a Process Group with a focus identifying feelings, planning for the rest of the day, and reviewing DBT skills for managing emotional distress. Group time: 75 minutes. Personal goal for participation: To increase the capacity to improve ones mood, structure, and coping capacity. Goal orientation: The patient will be able to identify their feelings, develop a plan for structuring their day, and begin to appreciate the possibility of successfully managing distress and other forms of intense emotions. Group therapy participation: With prompting, this patient participated in the group. Therapeutic interventions reviewed and discussed: The group members were asked to introduce themselves to each other and to see if they could identify an emotion they are having and/or let the group know what they want to focus on for the day as they continue to make discharge plans. The group members also reviewed five suggested areas of DBT options when experiencing intense emotions: distraction, improve the moment, self-soothe, pros and cons, and radical acceptance. They were asked to take turns reading from the handout and discussing its contents. At the end of group the patients were provided a summary of the topics covered. Impression of participation: The patient said he was \"well-rested\" and that he would be finalizing his aftercare with [facility] Danny [address] [place] [address]). He left group briefly to meet with his treatment team and when he returned, he indicated that he might be leaving the hospital as early as tomorrow, depending upon continued progress. He expressed no current SI/HI and displayed no overt psychotic symptoms. He was alert and generally oriented. His affect was less anxious than on admission and his mood matched his affect. He offered positive support to a couple of his peers and his finalizing his aftercare plans.

## 2018-05-10 NOTE — BH NOTES
GROUP THERAPY PROGRESS NOTE    Arnulfo Pina is participating in Discharge Planning Group    Group time: 1.5 hour    Personal goal for participation: Developing Wellness and Recovery Action Plan    Goal orientation: Building a Recovery Network and Sponsorship    Group therapy participation: active    Therapeutic interventions reviewed and discussed:     Impression of participation: Pt has expressed use of  WRAP is  probably  what he needs to help him organize his life. Pt is very engaged, receptive to new ideas and supportive of his peers and the  group process.

## 2018-05-10 NOTE — PROGRESS NOTES
Problem: Altered Thought Process (Adult/Pediatric)  Goal: *STG: Participates in treatment plan  Outcome: Progressing Towards Goal  Out on unit social w peers and staff. Mood and affect brighter and engaged. Thoughts organized, logical and goal directed. Denies AH and VH, reports medication effective and states sleep improved. Daily goa is to discuss d/c plans.  Staff focus is on d/c planning for tomorrow and offer support

## 2018-05-10 NOTE — DISCHARGE INSTRUCTIONS
DISCHARGE SUMMARY    NAME:Taurus Garcia  : 1985  MRN: [id number]    The patient Glorine Drivers exhibits the ability to control behavior in a less restrictive environment. Patient's level of functioning is improving. No assaultive/destructive behavior has been observed for the past 24 hours. No suicidal/homicidal threat or behavior has been observed for the past 24 hours. There is no evidence of serious medication side effects. Patient has not been in physical or protective restraints for at least the past 24 hours. If weapons involved, how are they secured? No weapons involved     Is patient aware of and in agreement with discharge plan? Yes    Arrangements for medication:  Prescriptions filled per [place]       Referral for substance abuse treatment? Yes, [address]    Referral for smoking cessation needed? Patient is not a smoker/Not applicable    Copy of discharge instructions to  provider?:  Yes, [place] [address] [facility] (Patricia Terrell. [phone]; Sindy Kerr [phone])    Arrangements for transportation home:  Family to      Keep all follow up appointments as scheduled, continue to take prescribed medications per physician instructions. Mental health crisis number:  [phone] or your local mental health crisis line number at [phone]. DISCHARGE SUMMARY from Nurse    PATIENT INSTRUCTIONS:     What to do at Home:  Recommended activity: Activity as tolerated,     If you experience any of the following symptoms thoughts of harming self, feeling paranoid and unsafe, please follow up with your assigned providers and local crisis number. *  Please give a list of your current medications to your Primary Care Provider. *  Please update this list whenever your medications are discontinued, doses are      changed, or new medications (including over-the-counter products) are added. *  Please carry medication information at all times in case of emergency situations.     These are general instructions for a healthy lifestyle:    No smoking/ No tobacco products/ Avoid exposure to second hand smoke  Surgeon General's Warning:  Quitting smoking now greatly reduces serious risk to your health. Obesity, smoking, and sedentary lifestyle greatly increases your risk for illness    A healthy diet, regular physical exercise & weight monitoring are important for maintaining a healthy lifestyle    You may be retaining fluid if you have a history of heart failure or if you experience any of the following symptoms:  Weight gain of 3 pounds or more overnight or 5 pounds in a week, increased swelling in our hands or feet or shortness of breath while lying flat in bed. Please call your doctor as soon as you notice any of these symptoms; do not wait until your next office visit. Recognize signs and symptoms of STROKE:    F-face looks uneven    A-arms unable to move or move unevenly    S-speech slurred or non-existent    T-time-call 911 as soon as signs and symptoms begin-DO NOT go       Back to bed or wait to see if you get better-TIME IS BRAIN. Warning Signs of HEART ATTACK     Call 911 if you have these symptoms:   Chest discomfort. Most heart attacks involve discomfort in the center of the chest that lasts more than a few minutes, or that goes away and comes back. It can feel like uncomfortable pressure, squeezing, fullness, or pain.  Discomfort in other areas of the upper body. Symptoms can include pain or discomfort in one or both arms, the back, neck, jaw, or stomach.  Shortness of breath with or without chest discomfort.  Other signs may include breaking out in a cold sweat, nausea, or lightheadedness. Don't wait more than five minutes to call 911 - MINUTES MATTER! Fast action can save your life. Calling 911 is almost always the fastest way to get lifesaving treatment.  Emergency Medical Services staff can begin treatment when they arrive -- up to an hour sooner than if someone gets to the hospital by car. The discharge information has been reviewed with the patient. The patient verbalized understanding. Discharge medications reviewed with the patient and appropriate educational materials and side effects teaching were provided.   ___________________________________________________________________________________________________________________________________

## 2018-05-10 NOTE — BH NOTES
GROUP THERAPY PROGRESS NOTE    Mariah Rocha is participating in [place].      Group time: 20 minutes    Personal goal for participation: find out when he was being discharge    Goal orientation: community    Group therapy participation: active    Therapeutic interventions reviewed and discussed: yes    Impression of participation: engaged

## 2018-05-11 VITALS
RESPIRATION RATE: 16 BRPM | WEIGHT: 190 LBS | TEMPERATURE: 97.6 F | SYSTOLIC BLOOD PRESSURE: 121 MMHG | DIASTOLIC BLOOD PRESSURE: 74 MMHG | HEIGHT: 73 IN | OXYGEN SATURATION: 99 % | HEART RATE: 64 BPM | BODY MASS INDEX: 25.18 KG/M2

## 2018-05-11 PROCEDURE — 74011250637 HC RX REV CODE- 250/637: Performed by: PSYCHIATRY & NEUROLOGY

## 2018-05-11 PROCEDURE — 74011250637 HC RX REV CODE- 250/637: Performed by: NURSE PRACTITIONER

## 2018-05-11 RX ORDER — FLUPHENAZINE HYDROCHLORIDE 5 MG/1
5 TABLET ORAL 2 TIMES DAILY
Qty: 14 TAB | Refills: 0 | Status: SHIPPED | OUTPATIENT
Start: 2018-05-11

## 2018-05-11 RX ORDER — FLUPHENAZINE HYDROCHLORIDE 5 MG/1
5 TABLET ORAL 2 TIMES DAILY
Qty: 30 TAB | Refills: 1 | Status: SHIPPED | OUTPATIENT
Start: 2018-05-11 | End: 2018-05-11

## 2018-05-11 RX ORDER — AMLODIPINE BESYLATE 10 MG/1
10 TABLET ORAL DAILY
Qty: 15 TAB | Refills: 1 | Status: SHIPPED | OUTPATIENT
Start: 2018-05-12 | End: 2018-05-11

## 2018-05-11 RX ORDER — AMLODIPINE BESYLATE 10 MG/1
10 TABLET ORAL DAILY
Qty: 7 TAB | Refills: 0 | Status: SHIPPED | OUTPATIENT
Start: 2018-05-12

## 2018-05-11 RX ADMIN — FLUPHENAZINE HYDROCHLORIDE 5 MG: 5 TABLET, FILM COATED ORAL at 08:30

## 2018-05-11 RX ADMIN — AMLODIPINE BESYLATE 10 MG: 5 TABLET ORAL at 08:30

## 2018-05-11 NOTE — PROGRESS NOTES
Problem: Altered Thought Process (Adult/Pediatric)  Goal: *STG: Participates in treatment plan  Outcome: Progressing Towards Goal  Out on unit social w peers and staff. Mood and affect bright and engaged. Thoughts organized, logical and goal directed. States plans for discharge is working on sobriety and attending therapy and support groups. Daily goal is to d/c home.  Staff ofcus is on d/c planning

## 2018-05-11 NOTE — BH NOTES
Behavioral Health Transition Record to Provider    Patient Name: Tony Watson  YOB: 1985  Medical Record Number: [id number]  Date of Admission: 5/5/2018  Date of Discharge: 5/11/2018    Attending Provider: Je Thornton MD  Discharging Provider: Je Thornton MD  To contact this individual call [phone] and ask the  to page. If unavailable, ask to be transferred to [facility] Provider on call. [facility] Provider will be available on call 24/7 and during holidays. Primary Care Provider: None    No Known Allergies    Reason for Admission: Pt admitted on an involuntary basis for severe psychosis, agitation, brought in from [facility].     Admission Diagnosis: psychosis  Psychosis, unspecified psychosis type    * No surgery found *    Results for orders placed or performed during the hospital encounter of 05/05/18   CK   Result Value Ref Range    CK 9441 (H) 39 - 735 U/L   METABOLIC PANEL, COMPREHENSIVE   Result Value Ref Range    Sodium 142 136 - 145 mmol/L    Potassium 4.6 3.5 - 5.1 mmol/L    Chloride 111 (H) 97 - 108 mmol/L    CO2 24 21 - 32 mmol/L    Anion gap 7 5 - 15 mmol/L    Glucose 80 65 - 100 mg/dL    BUN 12 6 - 20 MG/DL    Creatinine 1.18 0.70 - 1.30 MG/DL    BUN/Creatinine ratio 10 (L) 12 - 20      GFR est AA >60 >60 ml/min/1.73m2    GFR est non-AA >60 >60 ml/min/1.73m2    Calcium 9.4 8.5 - 10.1 MG/DL    Bilirubin, total 1.1 (H) 0.2 - 1.0 MG/DL    ALT (SGPT) 43 12 - 78 U/L    AST (SGOT) 133 (H) 15 - 37 U/L    Alk.  phosphatase 69 45 - 117 U/L    Protein, total 7.7 6.4 - 8.2 g/dL    Albumin 4.1 3.5 - 5.0 g/dL    Globulin 3.6 2.0 - 4.0 g/dL    A-G Ratio 1.1 1.1 - 2.2     TSH 3RD GENERATION   Result Value Ref Range    TSH 0.95 0.36 - 1.35 uIU/mL   METABOLIC PANEL, BASIC   Result Value Ref Range    Sodium 142 136 - 145 mmol/L    Potassium 3.7 3.5 - 5.1 mmol/L    Chloride 108 97 - 108 mmol/L    CO2 27 21 - 32 mmol/L    Anion gap 7 5 - 15 mmol/L    Glucose 92 65 - 100 mg/dL    BUN 14 6 - 20 MG/DL    Creatinine 1.25 0.70 - 1.30 MG/DL    BUN/Creatinine ratio 11 (L) 12 - 20      GFR est AA >60 >60 ml/min/1.73m2    GFR est non-AA >60 >60 ml/min/1.73m2    Calcium 9.1 8.5 - 10.1 MG/DL   CK   Result Value Ref Range    CK 8839 (H) 39 - 308 U/L   HEMOGLOBIN A1C WITH EAG   Result Value Ref Range    Hemoglobin A1c 5.1 4.2 - 6.3 %    Est. average glucose 100 mg/dL   GLUCOSE, FASTING   Result Value Ref Range    Glucose 89 65 - 100 MG/DL   LIPID PANEL   Result Value Ref Range    LIPID PROFILE          Cholesterol, total 147 <200 MG/DL    Triglyceride 66 <150 MG/DL    HDL Cholesterol 69 MG/DL    LDL, calculated 64.8 0 - 100 MG/DL    VLDL, calculated 13.2 MG/DL    CHOL/HDL Ratio 2.1 0 - 5.0     AMMONIA   Result Value Ref Range    Ammonia 56 (H) <32 UMOL/L   CK   Result Value Ref Range    CK 4753 (H) 39 - 491 U/L   METABOLIC PANEL, BASIC   Result Value Ref Range    Sodium 143 136 - 145 mmol/L    Potassium 3.8 3.5 - 5.1 mmol/L    Chloride 108 97 - 108 mmol/L    CO2 28 21 - 32 mmol/L    Anion gap 7 5 - 15 mmol/L    Glucose 91 65 - 100 mg/dL    BUN 12 6 - 20 MG/DL    Creatinine 1.17 0.70 - 1.30 MG/DL    BUN/Creatinine ratio 10 (L) 12 - 20      GFR est AA >60 >60 ml/min/1.73m2    GFR est non-AA >60 >60 ml/min/1.73m2    Calcium 8.9 8.5 - 10.1 MG/DL   CK   Result Value Ref Range    CK 2777 (H) 39 - 988 U/L   METABOLIC PANEL, BASIC   Result Value Ref Range    Sodium 142 136 - 145 mmol/L    Potassium 3.5 3.5 - 5.1 mmol/L    Chloride 109 (H) 97 - 108 mmol/L    CO2 26 21 - 32 mmol/L    Anion gap 7 5 - 15 mmol/L    Glucose 91 65 - 100 mg/dL    BUN 9 6 - 20 MG/DL    Creatinine 1.09 0.70 - 1.30 MG/DL    BUN/Creatinine ratio 8 (L) 12 - 20      GFR est AA >60 >60 ml/min/1.73m2    GFR est non-AA >60 >60 ml/min/1.73m2    Calcium 8.9 8.5 - 45.8 MG/DL   METABOLIC PANEL, BASIC   Result Value Ref Range    Sodium 142 136 - 145 mmol/L    Potassium 4.0 3.5 - 5.1 mmol/L    Chloride 106 97 - 108 mmol/L    CO2 28 21 - 32 mmol/L    Anion gap 8 5 - 15 mmol/L    Glucose 87 65 - 100 mg/dL    BUN 11 6 - 20 MG/DL    Creatinine 1.11 0.70 - 1.30 MG/DL    BUN/Creatinine ratio 10 (L) 12 - 20      GFR est AA >60 >60 ml/min/1.73m2    GFR est non-AA >60 >60 ml/min/1.73m2    Calcium 8.9 8.5 - 10.1 MG/DL   CK   Result Value Ref Range    CK 1505 (H) 39 - 308 U/L       Immunizations administered during this encounter: There is no immunization history on file for this patient. Screening for Metabolic Disorders for Patients on Antipsychotic Medications  (Data obtained from the EMR)    Estimated Body Mass Index  Estimated body mass index is 25.07 kg/(m^2) as calculated from the following:    Height as of this encounter: 6' 1\" (1.854 m). Weight as of this encounter: 86.2 kg (190 lb). Vital Signs/Blood Pressure  Visit Vitals    /74 (BP 1 Location: Right arm, BP Patient Position: At rest;Sitting)    Pulse 64    Temp 97.6 °F (36.4 °C)    Resp 16    Ht 6' 1\" (1.854 m)    Wt 86.2 kg (190 lb)    SpO2 99%    BMI 25.07 kg/m2       Blood Glucose/Hemoglobin A1c  Lab Results   Component Value Date/Time    Glucose 87 2018 05:44 AM    Glucose 89 2018 05:33 AM       Lab Results   Component Value Date/Time    Hemoglobin A1c 5.1 2018 05:33 AM        Lipid Panel  Lab Results   Component Value Date/Time    Cholesterol, total 147 2018 05:33 AM    HDL Cholesterol 69 2018 05:33 AM    LDL, calculated 64.8 2018 05:33 AM    Triglyceride 66 2018 05:33 AM    CHOL/HDL Ratio 2.1 2018 05:33 AM        Discharge Diagnosis: Psychosis (ICD-10-CM: F29)    Discharge Plan: Patient discharged home into the care of family. DISCHARGE SUMMARY    NAME:Taurus Draper  : 1985  MRN: [id number]    The patient Dio Chavez exhibits the ability to control behavior in a less restrictive environment. Patient's level of functioning is improving.   No assaultive/destructive behavior has been observed for the past 24 hours. No suicidal/homicidal threat or behavior has been observed for the past 24 hours. There is no evidence of serious medication side effects. Patient has not been in physical or protective restraints for at least the past 24 hours. If weapons involved, how are they secured? No weapons involved     Is patient aware of and in agreement with discharge plan? Yes    Arrangements for medication:  Prescriptions filled per [place]       Referral for substance abuse treatment? Yes, [address]    Referral for smoking cessation needed? Patient is not a smoker/Not applicable    Copy of discharge instructions to  provider?:  Yes, [place] [address] [facility] (DennisenHalley Julesmatty [phone]; Biju Faisal [phone])    Arrangements for transportation home:  Family to      Keep all follow up appointments as scheduled, continue to take prescribed medications per physician instructions. Mental health crisis number:  685 or your local mental health crisis line number at [phone]. Discharge Medication List and Instructions:   Discharge Medication List as of 5/11/2018  1:46 PM      CONTINUE these medications which have CHANGED    Details   fluPHENAZine (PROLIXIN) 5 mg tablet Take 1 Tab by mouth two (2) times a day. Indications: Psychotic Disorder, Normal, Disp-14 Tab, R-0      amLODIPine (NORVASC) 10 mg tablet Take 1 Tab by mouth daily. Indications: hypertension, Normal, Disp-7 Tab, R-0             Unresulted Labs     None        To obtain results of studies pending at discharge, please contact [phone]    Follow-up Information     Follow up With Details Comments [address] On 5/16/2018 You have an 11:30am hospital discharge appointment with Tabby Guardado. Please remember to bring your photo ID. [address]  [address] [facility] [place] [address]  [phone]    None   None (641) Patient stated that they have no PCP            Advanced Directive:   Does the patient have an appointed surrogate decision maker? No  Does the patient have a Medical Advance Directive? No  Does the patient have a Psychiatric Advance Directive? No  If the patient does not have a surrogate or Medical Advance Directive AND Psychiatric Advance Directive, the patient was offered information on these advance directives Yes and Patient declined to complete    Patient Instructions: Please continue all medications until otherwise directed by physician. Tobacco Cessation Discharge Plan:   Is the patient a smoker and needs referral for smoking cessation? No  Patient referred to the following for smoking cessation with an appointment? Refused     Patient was offered medication to assist with smoking cessation at discharge? Refused  Was education for smoking cessation added to the discharge instructions? Yes    Alcohol/Substance Abuse Discharge Plan:   Does the patient have a history of substance/alcohol abuse and requires a referral for treatment? Yes  Patient referred to the following for substance/alcohol abuse treatment with an appointment? Yes, Patient has an intake appointment with [facility] Denisse WALKER on 5/16/18 at 11:30am.  Patient was offered medication to assist with alcohol cessation at discharge? Refused  Was education for substance/alcohol abuse added to discharge instructions? Yes    Patient discharged to Home; discussed with patient/caregiver and provided to the patient/caregiver either in hard copy or electronically.

## 2018-05-11 NOTE — PROGRESS NOTES
GROUP THERAPY PROGRESS NOTE    Mak Velazquez is participating in Goals Group. Group time: 45 minutes    Personal goal for participation: Have a big meal with family once discharged    Goal orientation: personal    Group therapy participation: active    Therapeutic interventions reviewed and discussed: Discussed unit rules and discharge process for those leaving today. Emphasized importance of attending groups and being present during stay in hospital in order to be engaged in treatment. Highlighted importance of setting daily goals and taking things a day at a time and to not compare your accomplishments to others. Talked about how everyone experiences stress and to not be hard on self when things don't work out as planned. Talked about importance of sharing goals with others in order to keep accountability and to spark ideas in others for what they might want to accomplish.     Impression of participation: Actively listened, positively contributed to conversation with peers and staff and had positive impact on group environment

## 2018-05-11 NOTE — INTERDISCIPLINARY ROUNDS
Behavioral Health Interdisciplinary Rounds     Patient Name: Rhea Iqbal  Age: 33 y.o.   Room/Bed:  729/  Primary Diagnosis: Psychosis   Admission Status: Voluntary Commitment     Readmission within 30 days: no  Power of  in place: no  Patient requires a blocked bed: no          Reason for blocked bed:     VTE Prophylaxis: No    Mobility needs/Fall risk: no    Nutritional Plan: no  Consults:        Labs/Testing due today?: no    Sleep hours: 6 1/2 hours      Participation in Care/Groups:    Medication Compliant?: yes   PRNS (last 24 hours): sleep aid     Restraints (last 24 hours):  no     CIWA (range last 24 hours):     COWS (range last 24 hours):      Alcohol screening (AUDIT) completed -   AUDIT Score: 0     If applicable, date SBIRT discussed in treatment team AND documented: N/A    Tobacco - patient is a smoker: Have You Used Tobacco in the Past 30 Days: No  Illegal Drugs use: Have You Used Any Illegal Substances Over the Past 12 Months: Yes    24 hour chart check complete: yes    Patient goal(s) for today: Discharge  Treatment team focus/goals: Discharge  Progress note: Patient is stable and ready for discharge     LOS:  6  Expected LOS: 6    Financial concerns/prescription coverage:  Uninsured; GRANTED  Date of last family contact:     Family requesting physician contact today:  No  Discharge plan: Return home  Guns in the home: No      Outpatient provider(s): [address]    Participating treatment team members: Jesu Sheridan MSW; Dr. Gerardo Osullivan MD; Luna Abreu RN; [facility] medicine resident

## 2018-05-11 NOTE — PROGRESS NOTES
GROUP THERAPY PROGRESS NOTE    Mary Day is participating in [place]. Group time: 30 minutes    Personal goal for participation: think of what to discuss during treatment team tomorrow    Goal orientation: personal    Group therapy participation: active    Therapeutic interventions reviewed and discussed: Provided brief unit overview and discussed what to expect from treatment team for new patients, discussed what to expect with discharge for those leaving tomorrow. Discussed coping with stress and reviewed techniques and tools to utilize once discharged, tools such as deep breathing exercises and healthy activities that reduce stress in a positive way.     Impression of participation: Actively listened, positively contributed to conversation with peers and staff

## 2018-05-11 NOTE — BH NOTES
GROUP THERAPY PROGRESS NOTE    Chava Moody participated in a Process Group on the General Unit with a focus identifying feelings, planning for the day, and learning more about DBT concepts of \"Elf Help\" and the importance of self-care. Group time: 60 minutes. Personal goal for participation: To identify feelings and increase the capacity to take care of oneself first as a primary coping skill. Goal orientation: The patient will be able to introduce themselves to their peers, identify their feelings, and consider the importance of taking time for ones self-care as an important part of life planning and coping skill development. Group therapy participation: With minimal prompting, this patient actively participated in the group. Therapeutic interventions reviewed and discussed: The group members were asked to begin by introducing themselves and sharing about their feelings. They were then asked to  take turns reading from an outline of twenty-two behavioral suggestions from DBT, called the [address] [place] [address], were reviewed with the group members and discussed as a group. The suggestions came with drawings of elves engaged in the activities described. These suggestions included: 1) trusting yourself (wise mind); 2) take a day off to retreat (observe and describe); 3) talk and play with children or adults (participate); 4) when you cant stop thinking, feel (non-judgmental stance); 5) stay in the present by recognizing when anxiety gets you caught up in a future or a past (one mindfully); 6) take time to think before just jumping in to solve a problem (efficiently);  7) when angry, let yourself feel the anger (the function of emotion); 8) make time for play (adult pleasant activities); 9) when sad, think about what would be comforting (accepting and engaging the opposite); 10) put yourself first (self-soothe); 11) when uncomfortable being alone, think about being with others and decide whether to make it happen (improve imagery); 12) take time to wonder (improve meaning); 13) carve out time to be lazy (improve relaxation); 14) notice the sun and the moon as they rise and set (improve one thing at a time); 15) nothing is usually the hardest thing to do  but often it is the best (guidelines for accepting reality); 16) when things are in chaos and one is in a frenzy, ask yourself Rich Bathe is right about now?  (turning your mind); 17) learn to stand back and let others take their turn as leaders (radical acceptance); 18) when someone turns you down, it usually has to do with them and not you  ask someone else for what you need (using objective effectiveness); 19) when wanting to talk with someone new and are scared, breathe  dont rehearse, just plunge and if it doesnt go well, you can stop (using relationship effectiveness); 20) when you see someone elses hurt face, breathe  you are not responsible for making other people happy (no apologies); 21) when you want something from someone else, ask  asking is being true to yourself (be truthful); 22) when you are hurt, tell the person who hurt you (situations for interpersonal effectiveness). The group members were provided a summary sheet of the DBT information discussed, which they could also review on their own time. Impression of participation: The patient said he was feeling \"excited\" because he is being discharged later today and he's looking forward to seeing his family. He also indicated that he plans to follow-up with [facility]). He expressed no current SI/HI and displayed no overt psychotic symptoms. He participated in the group, was alert, and generally oriented. His affect is not as depressed or anxious as on admission - he was mildly euphoric. His mood reflected his affect.

## 2018-05-11 NOTE — PROGRESS NOTES
Problem: Falls - Risk of  Goal: *Absence of Falls  Document Kenisha Fall Risk and appropriate interventions in the flowsheet. Outcome: Progressing Towards Goal  Fall Risk Interventions:  Lying quietly in bed with eyes closed , respirations even and unlabored , NAD noted   Q15 min safety rounds continue , bathroom light on for safety .   Has been free of Falls this admission        Mentation Interventions: Adequate sleep, hydration, pain control    Medication Interventions: Teach patient to arise slowly
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
## 2018-05-11 NOTE — PROGRESS NOTES
Problem: Discharge Planning  Goal: *Discharge to safe environment  Outcome: Progressing Towards Goal  Pt is future oriented, affect is bright. No psychoses, planning to be discharged tomorrow.

## 2018-05-15 NOTE — PROCEDURES
PROCEDURE: ROUTINE INPATIENT EEG  NAME:   Socorro Loja  EEG NUMBER: [id number]  ACCOUNT NUMBER : [de-identified]  MRN:   [id number]  DATE OF SERVICE: 5/7/18    HISTORY/INDICATION: Pt with AMS after smoking marijuana. EEG is performed to evaluate for seizure. MEDICATIONS:   Current Outpatient Prescriptions   Medication Sig Dispense Refill    fluPHENAZine (PROLIXIN) 5 mg tablet Take 1 Tab by mouth two (2) times a day. Indications: Psychotic Disorder 14 Tab 0    amLODIPine (NORVASC) 10 mg tablet Take 1 Tab by mouth daily. Indications: hypertension 7 Tab 0       CONDITIONS OF RECORDING: This is a routine 21-channel EEG recording performed in accordance with the international 10-20 system with one channel devoted to limited EKG. This study was done during states of wakefulness and sleep. Photic stimulation was performed as an activating procedure. DESCRIPTION:   Upon maximal arousal the posterior dominant rhythm has a frequency of 10Hz with an amplitude of 40uV. This activity is symmetric over the bilateral posterior derivations and attenuates with eye opening. Photic stimulation did not significantly alter the tracing. The patient becomes drowsy, but deeper stages of sleep are not attained. There are no focal abnormalities, epileptiform discharges, or electrographic seizures seen. INTERPRETATION:  Normal awake and drowsy EEG    CLINICAL CORRELATION: A normal EEG does not definitively exclude a diagnosis of epilepsy if clinical suspicion is high consider sleep deprived EEG.      Tian Tobias MD
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
## 2018-12-23 ENCOUNTER — ED HISTORICAL/CONVERTED ENCOUNTER (OUTPATIENT)
Dept: OTHER | Age: 33
End: 2018-12-23

## 2019-01-18 ENCOUNTER — ED HISTORICAL/CONVERTED ENCOUNTER (OUTPATIENT)
Dept: OTHER | Age: 34
End: 2019-01-18

## 2020-03-27 ENCOUNTER — ED HISTORICAL/CONVERTED ENCOUNTER (OUTPATIENT)
Dept: OTHER | Age: 35
End: 2020-03-27

## 2020-03-28 ENCOUNTER — ED HISTORICAL/CONVERTED ENCOUNTER (OUTPATIENT)
Dept: OTHER | Age: 35
End: 2020-03-28